# Patient Record
Sex: MALE | Race: WHITE | NOT HISPANIC OR LATINO | Employment: FULL TIME | URBAN - METROPOLITAN AREA
[De-identification: names, ages, dates, MRNs, and addresses within clinical notes are randomized per-mention and may not be internally consistent; named-entity substitution may affect disease eponyms.]

---

## 2017-02-10 RX ORDER — FLUOXETINE HYDROCHLORIDE 20 MG/1
CAPSULE ORAL
Qty: 30 CAPSULE | Refills: 1 | OUTPATIENT
Start: 2017-02-10

## 2017-02-10 RX ORDER — LISINOPRIL 10 MG/1
TABLET ORAL
Qty: 60 TABLET | Refills: 1 | OUTPATIENT
Start: 2017-02-10

## 2017-02-10 RX ORDER — PRAVASTATIN SODIUM 40 MG
TABLET ORAL
Qty: 30 TABLET | Refills: 2 | OUTPATIENT
Start: 2017-02-10

## 2017-02-13 RX ORDER — FLUOXETINE HYDROCHLORIDE 20 MG/1
CAPSULE ORAL
Qty: 30 CAPSULE | Refills: 1 | Status: SHIPPED | OUTPATIENT
Start: 2017-02-13 | End: 2017-04-17 | Stop reason: SDUPTHER

## 2017-02-13 RX ORDER — LISINOPRIL 10 MG/1
TABLET ORAL
Qty: 60 TABLET | Refills: 1 | Status: SHIPPED | OUTPATIENT
Start: 2017-02-13 | End: 2017-04-17 | Stop reason: SDUPTHER

## 2017-02-13 RX ORDER — PRAVASTATIN SODIUM 40 MG
TABLET ORAL
Qty: 30 TABLET | Refills: 2 | Status: SHIPPED | OUTPATIENT
Start: 2017-02-13 | End: 2017-04-17 | Stop reason: SDUPTHER

## 2017-02-21 PROBLEM — I10 HYPERTENSION: Status: ACTIVE | Noted: 2017-02-21

## 2017-04-04 RX ORDER — LISINOPRIL 10 MG/1
TABLET ORAL
Qty: 60 TABLET | Refills: 0 | OUTPATIENT
Start: 2017-04-04

## 2017-04-04 RX ORDER — FLUOXETINE HYDROCHLORIDE 20 MG/1
CAPSULE ORAL
Qty: 30 CAPSULE | Refills: 0 | OUTPATIENT
Start: 2017-04-04

## 2017-04-17 RX ORDER — FLUOXETINE HYDROCHLORIDE 20 MG/1
20 CAPSULE ORAL DAILY
Qty: 30 CAPSULE | Refills: 0 | Status: SHIPPED | OUTPATIENT
Start: 2017-04-17 | End: 2017-05-15 | Stop reason: SDUPTHER

## 2017-04-17 RX ORDER — PRAVASTATIN SODIUM 40 MG
40 TABLET ORAL DAILY
Qty: 30 TABLET | Refills: 0 | Status: SHIPPED | OUTPATIENT
Start: 2017-04-17 | End: 2017-05-18 | Stop reason: SDUPTHER

## 2017-04-17 RX ORDER — LISINOPRIL 10 MG/1
TABLET ORAL
Qty: 60 TABLET | Refills: 0 | Status: SHIPPED | OUTPATIENT
Start: 2017-04-17 | End: 2017-05-15 | Stop reason: SDUPTHER

## 2017-05-15 ENCOUNTER — OFFICE VISIT (OUTPATIENT)
Dept: INTERNAL MEDICINE | Facility: CLINIC | Age: 47
End: 2017-05-15

## 2017-05-15 VITALS
BODY MASS INDEX: 26.47 KG/M2 | HEART RATE: 89 BPM | WEIGHT: 195.2 LBS | DIASTOLIC BLOOD PRESSURE: 90 MMHG | OXYGEN SATURATION: 98 % | SYSTOLIC BLOOD PRESSURE: 142 MMHG

## 2017-05-15 DIAGNOSIS — I10 ESSENTIAL HYPERTENSION: ICD-10-CM

## 2017-05-15 DIAGNOSIS — G89.29 CHRONIC BILATERAL LOW BACK PAIN WITH BILATERAL SCIATICA: Primary | ICD-10-CM

## 2017-05-15 DIAGNOSIS — M54.42 CHRONIC BILATERAL LOW BACK PAIN WITH BILATERAL SCIATICA: Primary | ICD-10-CM

## 2017-05-15 DIAGNOSIS — M54.41 CHRONIC BILATERAL LOW BACK PAIN WITH BILATERAL SCIATICA: Primary | ICD-10-CM

## 2017-05-15 DIAGNOSIS — F43.89 REACTION TO CHRONIC STRESS: ICD-10-CM

## 2017-05-15 DIAGNOSIS — Z72.0 TOBACCO USE: ICD-10-CM

## 2017-05-15 PROCEDURE — 99214 OFFICE O/P EST MOD 30 MIN: CPT | Performed by: INTERNAL MEDICINE

## 2017-05-15 RX ORDER — FLUOXETINE HYDROCHLORIDE 20 MG/1
40 CAPSULE ORAL DAILY
Qty: 60 CAPSULE | Refills: 5 | Status: SHIPPED | OUTPATIENT
Start: 2017-05-15 | End: 2017-11-15

## 2017-05-15 RX ORDER — VARENICLINE TARTRATE 1 MG/1
1 TABLET, FILM COATED ORAL 2 TIMES DAILY
Qty: 60 TABLET | Refills: 1 | Status: SHIPPED | OUTPATIENT
Start: 2017-05-15 | End: 2018-06-04

## 2017-05-15 RX ORDER — LISINOPRIL 10 MG/1
TABLET ORAL
Qty: 60 TABLET | Refills: 0 | Status: SHIPPED | OUTPATIENT
Start: 2017-05-15 | End: 2017-06-14 | Stop reason: SDUPTHER

## 2017-05-17 ENCOUNTER — TELEPHONE (OUTPATIENT)
Dept: INTERNAL MEDICINE | Facility: CLINIC | Age: 47
End: 2017-05-17

## 2017-05-18 RX ORDER — PRAVASTATIN SODIUM 40 MG
40 TABLET ORAL DAILY
Qty: 30 TABLET | Refills: 5 | Status: SHIPPED | OUTPATIENT
Start: 2017-05-18 | End: 2018-06-04

## 2017-05-26 ENCOUNTER — TELEPHONE (OUTPATIENT)
Dept: INTERNAL MEDICINE | Facility: CLINIC | Age: 47
End: 2017-05-26

## 2017-05-30 ENCOUNTER — HOSPITAL ENCOUNTER (OUTPATIENT)
Dept: MRI IMAGING | Facility: HOSPITAL | Age: 47
Discharge: HOME | End: 2017-05-30
Attending: INTERNAL MEDICINE | Admitting: INTERNAL MEDICINE

## 2017-05-30 PROCEDURE — 72148 MRI LUMBAR SPINE W/O DYE: CPT

## 2017-06-14 DIAGNOSIS — I10 ESSENTIAL HYPERTENSION: ICD-10-CM

## 2017-06-15 RX ORDER — LISINOPRIL 10 MG/1
TABLET ORAL
Qty: 60 TABLET | Refills: 2 | Status: SHIPPED | OUTPATIENT
Start: 2017-06-15 | End: 2017-09-13 | Stop reason: SDUPTHER

## 2017-07-06 ENCOUNTER — OFFICE VISIT (OUTPATIENT)
Dept: INTERNAL MEDICINE | Facility: CLINIC | Age: 47
End: 2017-07-06

## 2017-07-06 VITALS
OXYGEN SATURATION: 98 % | DIASTOLIC BLOOD PRESSURE: 86 MMHG | BODY MASS INDEX: 25.77 KG/M2 | HEART RATE: 108 BPM | WEIGHT: 190 LBS | SYSTOLIC BLOOD PRESSURE: 140 MMHG

## 2017-07-06 DIAGNOSIS — M54.41 CHRONIC BILATERAL LOW BACK PAIN WITH BILATERAL SCIATICA: Primary | ICD-10-CM

## 2017-07-06 DIAGNOSIS — G89.29 CHRONIC BILATERAL LOW BACK PAIN WITH BILATERAL SCIATICA: Primary | ICD-10-CM

## 2017-07-06 DIAGNOSIS — M54.42 CHRONIC BILATERAL LOW BACK PAIN WITH BILATERAL SCIATICA: Primary | ICD-10-CM

## 2017-07-06 PROCEDURE — 99213 OFFICE O/P EST LOW 20 MIN: CPT | Performed by: INTERNAL MEDICINE

## 2017-07-06 NOTE — PROGRESS NOTES
discuss results (MRI results performed on 5/30/17)    Subjective   Galo Jama is a 46 y.o. male is here today for follow-up.    History of Present Illness   Galo is here for a follow up on his MRI. He has been having increased problems and is on a very busy schedule.  He would like to proceed with Neurosurgery.He notes that he has been on meds since 2003- the HC/ apap, neurontin and elavil for > 7-8 years and has had injections by Dr. Hernandez and Tim in the past.  Asking if I can continue his meds, or in a different direction than pain management.  Echols snot started chantix yet.      Current Outpatient Prescriptions:   •  amitriptyline (ELAVIL) 50 MG tablet, Take 1 tablet by mouth every night., Disp: , Rfl:   •  FLUoxetine (PROzac) 20 MG capsule, Take 2 capsules by mouth Daily., Disp: 60 capsule, Rfl: 5  •  gabapentin (NEURONTIN) 600 MG tablet, Take 1 tablet by mouth 3 (three) times a day., Disp: , Rfl:   •  HYDROcodone-acetaminophen (NORCO)  MG per tablet, Take 1 tablet by mouth every 6 (six) hours as needed., Disp: , Rfl:   •  lisinopril (PRINIVIL,ZESTRIL) 10 MG tablet, TAKE ONE TABLET BY MOUTH TWICE A DAY, Disp: 60 tablet, Rfl: 2  •  pravastatin (PRAVACHOL) 40 MG tablet, Take 1 tablet by mouth Daily., Disp: 30 tablet, Rfl: 5  •  tiZANidine (ZANAFLEX) 4 MG tablet, Take 1 tablet by mouth 3 (three) times a day., Disp: , Rfl:   •  varenicline (CHANTIX CONTINUING MONTH PAK) 1 MG tablet, Take 1 tablet by mouth 2 (Two) Times a Day., Disp: 60 tablet, Rfl: 1  •  varenicline (CHANTIX STARTING MONTH PAK) 0.5 MG X 11 & 1 MG X 42 tablet, Take 0.5 mg one daily on days 1-2 and 0.5 mg twice daily on days 4-7. Then 1 mg twice daily for a total of 12 weeks., Disp: 53 tablet, Rfl: 0      The following portions of the patient's history were reviewed and updated as appropriate: allergies, current medications, past family history, past medical history, past social history, past surgical history and problem  list.    Review of Systems   Constitutional: Negative.  Negative for chills and fever.   HENT: Negative for ear discharge, ear pain, sinus pressure and sore throat.    Respiratory: Negative for cough, chest tightness and shortness of breath.    Cardiovascular: Negative for chest pain, palpitations and leg swelling.   Gastrointestinal: Negative for diarrhea, nausea and vomiting.   Musculoskeletal: Positive for arthralgias, back pain and myalgias.   Neurological: Negative for dizziness, syncope and headaches.   Psychiatric/Behavioral: Negative for confusion and sleep disturbance.       Objective   /86  Pulse 108  Wt 190 lb (86.2 kg)  SpO2 98%  BMI 25.77 kg/m2  Physical Exam   Musculoskeletal: He exhibits tenderness (rt back tenderness- L2-4).         Results for orders placed or performed in visit on 04/21/16    MAMMOGRAPHY   Result Value Ref Range     Mammogram       single polyp, mild diverticulosis, otherwise normal             Assessment/Plan   Diagnoses and all orders for this visit:    Chronic bilateral low back pain with bilateral sciatica  -     Ambulatory Referral to Neurosurgery             Will check with Dr. Escobar first. His dtrs work at Dr. Carrasco's office, but wants to hold off on that for now, and look at surgical options.    Return in about 6 months (around 1/6/2018) for Next scheduled follow up.

## 2017-09-13 DIAGNOSIS — I10 ESSENTIAL HYPERTENSION: ICD-10-CM

## 2017-09-13 RX ORDER — LISINOPRIL 10 MG/1
TABLET ORAL
Qty: 60 TABLET | Refills: 1 | Status: SHIPPED | OUTPATIENT
Start: 2017-09-13 | End: 2017-11-10 | Stop reason: SDUPTHER

## 2017-11-10 DIAGNOSIS — I10 ESSENTIAL HYPERTENSION: ICD-10-CM

## 2017-11-11 RX ORDER — LISINOPRIL 10 MG/1
TABLET ORAL
Qty: 60 TABLET | Refills: 0 | Status: SHIPPED | OUTPATIENT
Start: 2017-11-11 | End: 2017-12-11 | Stop reason: SDUPTHER

## 2017-11-15 ENCOUNTER — OFFICE VISIT (OUTPATIENT)
Dept: INTERNAL MEDICINE | Facility: CLINIC | Age: 47
End: 2017-11-15

## 2017-11-15 VITALS
OXYGEN SATURATION: 100 % | HEART RATE: 74 BPM | DIASTOLIC BLOOD PRESSURE: 78 MMHG | BODY MASS INDEX: 24.79 KG/M2 | WEIGHT: 183 LBS | RESPIRATION RATE: 16 BRPM | SYSTOLIC BLOOD PRESSURE: 148 MMHG | HEIGHT: 72 IN

## 2017-11-15 DIAGNOSIS — G89.29 CHRONIC BILATERAL LOW BACK PAIN WITH BILATERAL SCIATICA: Primary | ICD-10-CM

## 2017-11-15 DIAGNOSIS — M79.7 FIBROMYALGIA: ICD-10-CM

## 2017-11-15 DIAGNOSIS — M54.42 CHRONIC BILATERAL LOW BACK PAIN WITH BILATERAL SCIATICA: Primary | ICD-10-CM

## 2017-11-15 DIAGNOSIS — M54.41 CHRONIC BILATERAL LOW BACK PAIN WITH BILATERAL SCIATICA: Primary | ICD-10-CM

## 2017-11-15 PROCEDURE — 99213 OFFICE O/P EST LOW 20 MIN: CPT | Performed by: INTERNAL MEDICINE

## 2017-11-15 RX ORDER — DULOXETIN HYDROCHLORIDE 30 MG/1
CAPSULE, DELAYED RELEASE ORAL
Qty: 60 CAPSULE | Refills: 3 | Status: SHIPPED | OUTPATIENT
Start: 2017-11-15 | End: 2018-06-04

## 2017-11-15 NOTE — PROGRESS NOTES
Back Pain (lower back pain follow up)    Subjective   Galo Jama is a 47 y.o. male is here today for follow-up.    History of Present Illness   Galo is here for follow up on his back pain, he never made it to his neurosurgery appointment, or to Dr. Carrasco.  He notes that he feels he as fibromyalgia. He has researched that he meets the criteria.  He is asking if he can be treated with gabapentin or Lyrica? does not give any history as below-   Per his sister, he was with Dr. Hernandez, but had a UDS with THC positive hence terminated, and needs his Gabapentin today.      Current Outpatient Prescriptions:   •  amitriptyline (ELAVIL) 50 MG tablet, Take 1 tablet by mouth every night., Disp: , Rfl:   •  lisinopril (PRINIVIL,ZESTRIL) 10 MG tablet, TAKE ONE TABLET BY MOUTH TWICE A DAY, Disp: 60 tablet, Rfl: 0  •  pravastatin (PRAVACHOL) 40 MG tablet, Take 1 tablet by mouth Daily., Disp: 30 tablet, Rfl: 5  •  varenicline (CHANTIX CONTINUING MONTH PAK) 1 MG tablet, Take 1 tablet by mouth 2 (Two) Times a Day., Disp: 60 tablet, Rfl: 1  •  varenicline (CHANTIX STARTING MONTH PAK) 0.5 MG X 11 & 1 MG X 42 tablet, Take 0.5 mg one daily on days 1-2 and 0.5 mg twice daily on days 4-7. Then 1 mg twice daily for a total of 12 weeks., Disp: 53 tablet, Rfl: 0  •  DULoxetine (CYMBALTA) 30 MG capsule, 1 PO daily x 1 week then 2 PO daily., Disp: 60 capsule, Rfl: 3      The following portions of the patient's history were reviewed and updated as appropriate: allergies, current medications, past family history, past medical history, past social history, past surgical history and problem list.    Review of Systems   Constitutional: Positive for fatigue. Negative for chills and fever.   HENT: Negative for ear discharge, ear pain, sinus pressure and sore throat.    Respiratory: Negative for cough, chest tightness and shortness of breath.    Cardiovascular: Negative for chest pain, palpitations and leg swelling.   Gastrointestinal:  "Negative for diarrhea, nausea and vomiting.   Musculoskeletal: Positive for arthralgias, back pain and myalgias.   Neurological: Negative for dizziness, syncope and headaches.   Psychiatric/Behavioral: Positive for sleep disturbance. Negative for confusion.       Objective   /78  Pulse 74  Resp 16  Ht 72\" (182.9 cm)  Wt 183 lb (83 kg)  SpO2 100%  BMI 24.82 kg/m2  Physical Exam   Constitutional: He is oriented to person, place, and time. He appears well-developed and well-nourished.   HENT:   Head: Normocephalic and atraumatic.   Mouth/Throat: No oropharyngeal exudate.   Eyes: Conjunctivae are normal. Pupils are equal, round, and reactive to light.   Neck: Neck supple. No thyromegaly present.   Cardiovascular: Normal rate and regular rhythm.    Pulmonary/Chest: Effort normal and breath sounds normal.   Abdominal: Soft. Bowel sounds are normal. He exhibits no distension. There is no tenderness.   Musculoskeletal: He exhibits tenderness (rt back muscle spasm in t-8- t-10). He exhibits no edema.   Neurological: He is alert and oriented to person, place, and time. No cranial nerve deficit.   Skin: Skin is warm and dry.   Psychiatric: He has a normal mood and affect. Judgment normal.   Nursing note and vitals reviewed.        Results for orders placed or performed in visit on 04/21/16    MAMMOGRAPHY   Result Value Ref Range     Mammogram       single polyp, mild diverticulosis, otherwise normal             Assessment/Plan   Diagnoses and all orders for this visit:    Chronic bilateral low back pain with bilateral sciatica  -     DULoxetine (CYMBALTA) 30 MG capsule; 1 PO daily x 1 week then 2 PO daily.  -     Ambulatory Referral to Pain Management    Fibromyalgia  -     DULoxetine (CYMBALTA) 30 MG capsule; 1 PO daily x 1 week then 2 PO daily.  -     Ambulatory Referral to Pain Management      Adv unable to start any controls,start cymbalta, refer to [facility].       D/c prozac and start cymbalta.    Return for " Next scheduled follow up.

## 2017-12-11 DIAGNOSIS — I10 ESSENTIAL HYPERTENSION: ICD-10-CM

## 2017-12-11 RX ORDER — LISINOPRIL 10 MG/1
TABLET ORAL
Qty: 60 TABLET | Refills: 0 | Status: SHIPPED | OUTPATIENT
Start: 2017-12-11 | End: 2018-01-09 | Stop reason: SDUPTHER

## 2018-01-09 DIAGNOSIS — I10 ESSENTIAL HYPERTENSION: ICD-10-CM

## 2018-01-09 RX ORDER — LISINOPRIL 10 MG/1
TABLET ORAL
Qty: 60 TABLET | Refills: 0 | Status: SHIPPED | OUTPATIENT
Start: 2018-01-09 | End: 2018-02-09 | Stop reason: SDUPTHER

## 2018-01-22 RX ORDER — PRAVASTATIN SODIUM 40 MG
40 TABLET ORAL DAILY
Qty: 30 TABLET | Refills: 0 | Status: SHIPPED | OUTPATIENT
Start: 2018-01-22 | End: 2018-03-06 | Stop reason: SDUPTHER

## 2018-02-09 DIAGNOSIS — I10 ESSENTIAL HYPERTENSION: ICD-10-CM

## 2018-02-09 RX ORDER — LISINOPRIL 10 MG/1
TABLET ORAL
Qty: 60 TABLET | Refills: 0 | Status: SHIPPED | OUTPATIENT
Start: 2018-02-09 | End: 2018-09-06

## 2018-03-06 DIAGNOSIS — I10 ESSENTIAL HYPERTENSION: ICD-10-CM

## 2018-03-06 RX ORDER — PRAVASTATIN SODIUM 40 MG
TABLET ORAL
Qty: 30 TABLET | Refills: 0 | Status: SHIPPED | OUTPATIENT
Start: 2018-03-06 | End: 2018-09-06

## 2018-03-07 RX ORDER — LISINOPRIL 10 MG/1
TABLET ORAL
Qty: 60 TABLET | Refills: 0 | OUTPATIENT
Start: 2018-03-07

## 2018-06-06 ENCOUNTER — TRANSCRIBE ORDERS (OUTPATIENT)
Dept: PHYSICAL THERAPY | Facility: CLINIC | Age: 48
End: 2018-06-06

## 2018-06-06 ENCOUNTER — TELEPHONE (OUTPATIENT)
Dept: URGENT CARE | Facility: CLINIC | Age: 48
End: 2018-06-06

## 2018-06-06 ENCOUNTER — OFFICE VISIT (OUTPATIENT)
Dept: PHYSICAL THERAPY | Facility: CLINIC | Age: 48
End: 2018-06-06

## 2018-06-06 DIAGNOSIS — S62.524A NONDISPLACED FRACTURE OF DISTAL PHALANX OF RIGHT THUMB, INITIAL ENCOUNTER FOR CLOSED FRACTURE: Primary | ICD-10-CM

## 2018-06-06 PROCEDURE — L3913 HFO W/O JOINTS CF: HCPCS | Performed by: PHYSICAL THERAPIST

## 2018-06-08 NOTE — PROGRESS NOTES
Galo Jama 1970   Diagnosis/ Surgery: Right thumb fracture               Date Of Injury: 6/4/18    Date Of Surgery:NA    Hand Dominance: right   History of Present Condition: Pt fell off a ladder at home, he tried to catch himself with the right hand and fractured the thumb  Medical/Vocational History/ Medications: Pt works as a supervisor for a , he is able to complete all job duties. Pt has history of previous injury to the left hand that limits his function with that hand.     Pain: 6-7/10    Edema: mild-moderate   Sensibility: WNL   Wound Status: NA  ROM/ Strength/Test: NT    Splinting:  · Patient was measure and fit with a custom fabricated hand based thumb spica    · Patient was instructed in wearing schedule, precautions and care of the splint during this visit.   · Patient was instructed in proper donning/doffing of splint.   Assessment:  · Patient was fitted and appropriate splint was fabricated this date.  · Patient reported that splint was comfortable and had no complications with the fit of the splint.  · Patient was instructed and patient verbalized understanding of precautions, wear and care of the splint.   · Patient demonstrated independent donning/doffing of splint during treatment today.  Goals:  · Patient was fitted properly with appropriate splint for diagnosis  · Patient was educated on precautions, wear schedule and care of splint  · Patient demonstrated independence with donning/doffing of the splint.  · Splint was provided to Protect Healing Structures, Restrict Mobility, Improve joint alignment.  Plan:  · No additional treatment is required for this patient at this time. The patient is therefore discharged from therapy.  · Patient advised to contact therapist with any additional questions or concerns regarding the fit and function of the splint.  · Patient will be seen for splint issues as needed   · Wear Instructions: Off for hygiene       PT SIGNATURE: Shayan Orlando,  PT   DATE TREATMENT INITIATED: 6/8/2018    Physician Signature____________________________________ Date____________

## 2018-09-06 ENCOUNTER — APPOINTMENT (OUTPATIENT)
Dept: CARDIOLOGY | Facility: HOSPITAL | Age: 48
End: 2018-09-06

## 2018-09-06 ENCOUNTER — APPOINTMENT (OUTPATIENT)
Dept: GENERAL RADIOLOGY | Facility: HOSPITAL | Age: 48
End: 2018-09-06

## 2018-09-06 ENCOUNTER — HOSPITAL ENCOUNTER (EMERGENCY)
Facility: HOSPITAL | Age: 48
Discharge: HOME | End: 2018-09-06
Attending: EMERGENCY MEDICINE | Admitting: EMERGENCY MEDICINE

## 2018-09-06 VITALS
SYSTOLIC BLOOD PRESSURE: 128 MMHG | HEART RATE: 72 BPM | WEIGHT: 193 LBS | OXYGEN SATURATION: 99 % | RESPIRATION RATE: 16 BRPM | TEMPERATURE: 97.6 F | DIASTOLIC BLOOD PRESSURE: 69 MMHG | BODY MASS INDEX: 26.14 KG/M2 | HEIGHT: 72 IN

## 2018-09-06 DIAGNOSIS — I10 ESSENTIAL HYPERTENSION: Primary | ICD-10-CM

## 2018-09-06 DIAGNOSIS — R07.89 ATYPICAL CHEST PAIN: ICD-10-CM

## 2018-09-06 LAB
ALBUMIN SERPL-MCNC: 4.45 G/DL (ref 3.2–4.8)
ALBUMIN/GLOB SERPL: 2 G/DL (ref 1.5–2.5)
ALP SERPL-CCNC: 66 U/L (ref 25–100)
ALT SERPL W P-5'-P-CCNC: 15 U/L (ref 7–40)
ANION GAP SERPL CALCULATED.3IONS-SCNC: 4 MMOL/L (ref 3–11)
AST SERPL-CCNC: 21 U/L (ref 0–33)
BASOPHILS # BLD AUTO: 0.02 10*3/MM3 (ref 0–0.2)
BASOPHILS NFR BLD AUTO: 0.2 % (ref 0–1)
BH CV STRESS BP STAGE 1: NORMAL
BH CV STRESS BP STAGE 2: NORMAL
BH CV STRESS BP STAGE 3: NORMAL
BH CV STRESS DURATION MIN STAGE 1: 3
BH CV STRESS DURATION MIN STAGE 2: 3
BH CV STRESS DURATION MIN STAGE 3: 3
BH CV STRESS DURATION MIN STAGE 4: 1
BH CV STRESS DURATION SEC STAGE 1: 0
BH CV STRESS DURATION SEC STAGE 2: 0
BH CV STRESS DURATION SEC STAGE 3: 0
BH CV STRESS DURATION SEC STAGE 4: 30
BH CV STRESS GRADE STAGE 1: 10
BH CV STRESS GRADE STAGE 2: 12
BH CV STRESS GRADE STAGE 3: 14
BH CV STRESS GRADE STAGE 4: 16
BH CV STRESS HR STAGE 1: 108
BH CV STRESS HR STAGE 2: 116
BH CV STRESS HR STAGE 3: 133
BH CV STRESS HR STAGE 4: 136
BH CV STRESS METS STAGE 1: 5
BH CV STRESS METS STAGE 2: 7.5
BH CV STRESS METS STAGE 3: 10
BH CV STRESS METS STAGE 4: 13.5
BH CV STRESS PROTOCOL 1: NORMAL
BH CV STRESS RECOVERY BP: NORMAL MMHG
BH CV STRESS RECOVERY HR: 86 BPM
BH CV STRESS SPEED STAGE 1: 1.7
BH CV STRESS SPEED STAGE 2: 2.5
BH CV STRESS SPEED STAGE 3: 3.4
BH CV STRESS SPEED STAGE 4: 4.2
BH CV STRESS STAGE 1: 1
BH CV STRESS STAGE 2: 2
BH CV STRESS STAGE 3: 3
BH CV STRESS STAGE 4: 4
BILIRUB SERPL-MCNC: 0.3 MG/DL (ref 0.3–1.2)
BNP SERPL-MCNC: 8 PG/ML (ref 0–100)
BUN BLD-MCNC: 13 MG/DL (ref 9–23)
BUN/CREAT SERPL: 15.5 (ref 7–25)
CALCIUM SPEC-SCNC: 8.8 MG/DL (ref 8.7–10.4)
CHLORIDE SERPL-SCNC: 107 MMOL/L (ref 99–109)
CO2 SERPL-SCNC: 29 MMOL/L (ref 20–31)
CREAT BLD-MCNC: 0.84 MG/DL (ref 0.6–1.3)
DEPRECATED RDW RBC AUTO: 43.6 FL (ref 37–54)
EOSINOPHIL # BLD AUTO: 0.19 10*3/MM3 (ref 0–0.3)
EOSINOPHIL NFR BLD AUTO: 2.1 % (ref 0–3)
ERYTHROCYTE [DISTWIDTH] IN BLOOD BY AUTOMATED COUNT: 13.6 % (ref 11.3–14.5)
GFR SERPL CREATININE-BSD FRML MDRD: 98 ML/MIN/1.73
GLOBULIN UR ELPH-MCNC: 2.3 GM/DL
GLUCOSE BLD-MCNC: 105 MG/DL (ref 70–100)
HCT VFR BLD AUTO: 45 % (ref 38.9–50.9)
HGB BLD-MCNC: 14.7 G/DL (ref 13.1–17.5)
HOLD SPECIMEN: NORMAL
HOLD SPECIMEN: NORMAL
IMM GRANULOCYTES # BLD: 0.02 10*3/MM3 (ref 0–0.03)
IMM GRANULOCYTES NFR BLD: 0.2 % (ref 0–0.6)
LIPASE SERPL-CCNC: 20 U/L (ref 6–51)
LV EF NUC BP: 58 %
LYMPHOCYTES # BLD AUTO: 2.46 10*3/MM3 (ref 0.6–4.8)
LYMPHOCYTES NFR BLD AUTO: 27.2 % (ref 24–44)
MAXIMAL PREDICTED HEART RATE: 173 BPM
MCH RBC QN AUTO: 28.5 PG (ref 27–31)
MCHC RBC AUTO-ENTMCNC: 32.7 G/DL (ref 32–36)
MCV RBC AUTO: 87.2 FL (ref 80–99)
MONOCYTES # BLD AUTO: 0.73 10*3/MM3 (ref 0–1)
MONOCYTES NFR BLD AUTO: 8.1 % (ref 0–12)
NEUTROPHILS # BLD AUTO: 5.66 10*3/MM3 (ref 1.5–8.3)
NEUTROPHILS NFR BLD AUTO: 62.4 % (ref 41–71)
PERCENT MAX PREDICTED HR: 88.44 %
PLATELET # BLD AUTO: 174 10*3/MM3 (ref 150–450)
PMV BLD AUTO: 10 FL (ref 6–12)
POTASSIUM BLD-SCNC: 4.5 MMOL/L (ref 3.5–5.5)
PROT SERPL-MCNC: 6.7 G/DL (ref 5.7–8.2)
RBC # BLD AUTO: 5.16 10*6/MM3 (ref 4.2–5.76)
SODIUM BLD-SCNC: 140 MMOL/L (ref 132–146)
STRESS BASELINE BP: NORMAL MMHG
STRESS BASELINE HR: 61 BPM
STRESS PERCENT HR: 104 %
STRESS POST ESTIMATED WORKLOAD: 10.8 METS
STRESS POST EXERCISE DUR MIN: 10 MIN
STRESS POST EXERCISE DUR SEC: 30 SEC
STRESS POST PEAK BP: NORMAL MMHG
STRESS POST PEAK HR: 153 BPM
STRESS TARGET HR: 147 BPM
TROPONIN I SERPL-MCNC: 0 NG/ML (ref 0–0.07)
TROPONIN I SERPL-MCNC: 0 NG/ML (ref 0–0.07)
WBC NRBC COR # BLD: 9.06 10*3/MM3 (ref 3.5–10.8)
WHOLE BLOOD HOLD SPECIMEN: NORMAL
WHOLE BLOOD HOLD SPECIMEN: NORMAL

## 2018-09-06 PROCEDURE — 84484 ASSAY OF TROPONIN QUANT: CPT

## 2018-09-06 PROCEDURE — 0 TECHNETIUM SESTAMIBI: Performed by: EMERGENCY MEDICINE

## 2018-09-06 PROCEDURE — 83880 ASSAY OF NATRIURETIC PEPTIDE: CPT | Performed by: EMERGENCY MEDICINE

## 2018-09-06 PROCEDURE — 93017 CV STRESS TEST TRACING ONLY: CPT

## 2018-09-06 PROCEDURE — 80053 COMPREHEN METABOLIC PANEL: CPT | Performed by: EMERGENCY MEDICINE

## 2018-09-06 PROCEDURE — 93018 CV STRESS TEST I&R ONLY: CPT | Performed by: INTERNAL MEDICINE

## 2018-09-06 PROCEDURE — 78452 HT MUSCLE IMAGE SPECT MULT: CPT

## 2018-09-06 PROCEDURE — 99284 EMERGENCY DEPT VISIT MOD MDM: CPT | Performed by: INTERNAL MEDICINE

## 2018-09-06 PROCEDURE — 85025 COMPLETE CBC W/AUTO DIFF WBC: CPT | Performed by: EMERGENCY MEDICINE

## 2018-09-06 PROCEDURE — 78452 HT MUSCLE IMAGE SPECT MULT: CPT | Performed by: INTERNAL MEDICINE

## 2018-09-06 PROCEDURE — 93005 ELECTROCARDIOGRAM TRACING: CPT | Performed by: EMERGENCY MEDICINE

## 2018-09-06 PROCEDURE — A9500 TC99M SESTAMIBI: HCPCS | Performed by: EMERGENCY MEDICINE

## 2018-09-06 PROCEDURE — 71045 X-RAY EXAM CHEST 1 VIEW: CPT

## 2018-09-06 PROCEDURE — 83690 ASSAY OF LIPASE: CPT | Performed by: EMERGENCY MEDICINE

## 2018-09-06 PROCEDURE — 99285 EMERGENCY DEPT VISIT HI MDM: CPT

## 2018-09-06 RX ORDER — ATORVASTATIN CALCIUM 40 MG/1
80 TABLET, FILM COATED ORAL DAILY
Status: DISCONTINUED | OUTPATIENT
Start: 2018-09-06 | End: 2018-09-06 | Stop reason: HOSPADM

## 2018-09-06 RX ORDER — ACETAMINOPHEN 500 MG
1000 TABLET ORAL ONCE
Status: COMPLETED | OUTPATIENT
Start: 2018-09-06 | End: 2018-09-06

## 2018-09-06 RX ORDER — ASPIRIN 81 MG/1
324 TABLET, CHEWABLE ORAL ONCE
Status: COMPLETED | OUTPATIENT
Start: 2018-09-06 | End: 2018-09-06

## 2018-09-06 RX ORDER — ASPIRIN 81 MG/1
81 TABLET ORAL DAILY
Qty: 30 TABLET | Refills: 11 | OUTPATIENT
Start: 2018-09-06 | End: 2020-06-14

## 2018-09-06 RX ORDER — ATORVASTATIN CALCIUM 80 MG/1
80 TABLET, FILM COATED ORAL DAILY
Qty: 30 TABLET | Refills: 11 | Status: SHIPPED | OUTPATIENT
Start: 2018-09-06 | End: 2019-09-05 | Stop reason: SDUPTHER

## 2018-09-06 RX ORDER — MAGNESIUM HYDROXIDE/ALUMINUM HYDROXICE/SIMETHICONE 120; 1200; 1200 MG/30ML; MG/30ML; MG/30ML
20 SUSPENSION ORAL ONCE
Status: COMPLETED | OUTPATIENT
Start: 2018-09-06 | End: 2018-09-06

## 2018-09-06 RX ORDER — SODIUM CHLORIDE 0.9 % (FLUSH) 0.9 %
10 SYRINGE (ML) INJECTION AS NEEDED
Status: DISCONTINUED | OUTPATIENT
Start: 2018-09-06 | End: 2018-09-06 | Stop reason: HOSPADM

## 2018-09-06 RX ORDER — LISINOPRIL 10 MG/1
20 TABLET ORAL ONCE
Status: COMPLETED | OUTPATIENT
Start: 2018-09-06 | End: 2018-09-06

## 2018-09-06 RX ORDER — LISINOPRIL 40 MG/1
40 TABLET ORAL DAILY
Qty: 30 TABLET | Refills: 11 | Status: SHIPPED | OUTPATIENT
Start: 2018-09-06 | End: 2019-09-05 | Stop reason: SDUPTHER

## 2018-09-06 RX ADMIN — ASPIRIN 81 MG 324 MG: 81 TABLET ORAL at 10:09

## 2018-09-06 RX ADMIN — TECHNETIUM TC 99M SESTAMIBI 1 DOSE: 1 INJECTION INTRAVENOUS at 15:00

## 2018-09-06 RX ADMIN — TECHNETIUM TC 99M SESTAMIBI 1 DOSE: 1 INJECTION INTRAVENOUS at 13:20

## 2018-09-06 RX ADMIN — ATORVASTATIN CALCIUM 80 MG: 40 TABLET, FILM COATED ORAL at 12:48

## 2018-09-06 RX ADMIN — ALUMINUM HYDROXIDE, MAGNESIUM HYDROXIDE, AND SIMETHICONE 20 ML: 200; 200; 20 SUSPENSION ORAL at 10:11

## 2018-09-06 RX ADMIN — ACETAMINOPHEN 1000 MG: 500 TABLET, FILM COATED ORAL at 14:07

## 2018-09-06 RX ADMIN — NITROGLYCERIN 1 INCH: 20 OINTMENT TOPICAL at 10:09

## 2018-09-06 RX ADMIN — LISINOPRIL 20 MG: 10 TABLET ORAL at 10:51

## 2018-09-06 NOTE — ED PROVIDER NOTES
Subjective   Mr. Galo Jama is a 48 y.o. male who presents to the ED with c/o CP. He reports that his wife woke him up around 0630 this morning and when he woke up he had burning CP that is worse with coughing and radiated into his left arm. He also complains of mild SoA, nausea, a cough, and left leg numbness but denies sweating, a sore throat, itchy eyes, runny nose, or an acidic taste in his mouth. He has taken a baby aspirin with no relief. He notes that he also had some CP a few weeks ago. He has a hx of HTN, HLD, GERD, angioplasty, and an endoscopy that showed stomach ulcers. He has a family hx of MI. He has not taken his Lisinopril in around 5 months. He is a smoker. No other acute complaints at this time.        History provided by:  Patient  Chest Pain   Pain quality: burning    Pain radiates to:  L arm  Pain severity:  Moderate  Onset quality:  Sudden  Timing:  Constant  Progression:  Resolved  Chronicity:  New  Worsened by:  Coughing  Ineffective treatments:  Certain positions  Associated symptoms: cough, nausea, numbness (Left leg) and shortness of breath    Associated symptoms: no diaphoresis    Risk factors: high cholesterol, hypertension and smoking    Risk factors: no diabetes mellitus        Review of Systems   Constitutional: Negative for diaphoresis.   HENT: Negative for rhinorrhea and sore throat.    Eyes: Negative for itching.   Respiratory: Positive for cough and shortness of breath.    Cardiovascular: Positive for chest pain.   Gastrointestinal: Positive for nausea.   Neurological: Positive for numbness (Left leg).   All other systems reviewed and are negative.      Past Medical History:   Diagnosis Date   • Depression    • Hyperlipidemia    • Hypertension    • Injury of back        No Known Allergies    Past Surgical History:   Procedure Laterality Date   • HAND TENDON REPAIR     • STEREOTACTIC PROCEDURE INTRACRANIAL / EXTRACRANIAL / SPINAL         Family History   Problem Relation Age of  Onset   • Cancer Other    • Diabetes Other    • Hypertension Other    • Kidney disease Other    • Heart attack Other    • Hyperlipidemia Other        Social History     Social History   • Marital status:      Social History Main Topics   • Smoking status: Current Every Day Smoker     Packs/day: 1.00   • Alcohol use Yes      Comment: 2 a year   • Drug use: No   • Sexual activity: Defer     Other Topics Concern   • Not on file         Objective   Physical Exam   Constitutional: He is oriented to person, place, and time. He appears well-developed and well-nourished. No distress.   HENT:   Head: Normocephalic and atraumatic.   Nose: Nose normal.   Mouth/Throat: Oropharynx is clear and moist.   Airway patent. Oropharynx benign.   Eyes: Conjunctivae are normal. No scleral icterus.   Neck: Normal range of motion and phonation normal. Neck supple.   Cardiovascular: Normal rate, regular rhythm and normal heart sounds.    Pulmonary/Chest: Effort normal and breath sounds normal. No respiratory distress. He exhibits tenderness.   TTP of the mid to lower sternum.   Abdominal: Soft. There is no tenderness.   Lymphadenopathy:     He has no cervical adenopathy.   Neurological: He is alert and oriented to person, place, and time.   Skin: Skin is warm and dry.   Psychiatric: He has a normal mood and affect. His behavior is normal.   Nursing note and vitals reviewed.      Procedures         ED Course  ED Course as of Sep 06 1823   Thu Sep 06, 2018   1035 Patient's pain is mostly resolved with occasional flutters although his blood pressure remains slightly elevated. I will consult with on-call cardiology. -LJI  [AT]   1041 I discussed the case with Dr. Burleson, cardiology, who tells me someone will be down to see Mr. Jama. -LJI  [AT]   1621 Dr. Burleson has read the stress test and informed the patient of the negative results.  In addition, he has sent prescriptions for BP and Chol to pt's pharmacy and wants f/u in 6-8  toney.  [LI]      ED Course User Index  [AT] Heather Hirsch  [LI] Adarsh Maldonado MD       Recent Results (from the past 24 hour(s))   Comprehensive Metabolic Panel    Collection Time: 09/06/18  8:01 AM   Result Value Ref Range    Glucose 105 (H) 70 - 100 mg/dL    BUN 13 9 - 23 mg/dL    Creatinine 0.84 0.60 - 1.30 mg/dL    Sodium 140 132 - 146 mmol/L    Potassium 4.5 3.5 - 5.5 mmol/L    Chloride 107 99 - 109 mmol/L    CO2 29.0 20.0 - 31.0 mmol/L    Calcium 8.8 8.7 - 10.4 mg/dL    Total Protein 6.7 5.7 - 8.2 g/dL    Albumin 4.45 3.20 - 4.80 g/dL    ALT (SGPT) 15 7 - 40 U/L    AST (SGOT) 21 0 - 33 U/L    Alkaline Phosphatase 66 25 - 100 U/L    Total Bilirubin 0.3 0.3 - 1.2 mg/dL    eGFR Non African Amer 98 >60 mL/min/1.73    Globulin 2.3 gm/dL    A/G Ratio 2.0 1.5 - 2.5 g/dL    BUN/Creatinine Ratio 15.5 7.0 - 25.0    Anion Gap 4.0 3.0 - 11.0 mmol/L   Lipase    Collection Time: 09/06/18  8:01 AM   Result Value Ref Range    Lipase 20 6 - 51 U/L   BNP    Collection Time: 09/06/18  8:01 AM   Result Value Ref Range    BNP 8.0 0.0 - 100.0 pg/mL   Light Blue Top    Collection Time: 09/06/18  8:01 AM   Result Value Ref Range    Extra Tube hold for add-on    Green Top (Gel)    Collection Time: 09/06/18  8:01 AM   Result Value Ref Range    Extra Tube Hold for add-ons.    Lavender Top    Collection Time: 09/06/18  8:01 AM   Result Value Ref Range    Extra Tube hold for add-on    Gold Top - SST    Collection Time: 09/06/18  8:01 AM   Result Value Ref Range    Extra Tube Hold for add-ons.    CBC Auto Differential    Collection Time: 09/06/18  8:01 AM   Result Value Ref Range    WBC 9.06 3.50 - 10.80 10*3/mm3    RBC 5.16 4.20 - 5.76 10*6/mm3    Hemoglobin 14.7 13.1 - 17.5 g/dL    Hematocrit 45.0 38.9 - 50.9 %    MCV 87.2 80.0 - 99.0 fL    MCH 28.5 27.0 - 31.0 pg    MCHC 32.7 32.0 - 36.0 g/dL    RDW 13.6 11.3 - 14.5 %    RDW-SD 43.6 37.0 - 54.0 fl    MPV 10.0 6.0 - 12.0 fL    Platelets 174 150 - 450 10*3/mm3    Neutrophil % 62.4  41.0 - 71.0 %    Lymphocyte % 27.2 24.0 - 44.0 %    Monocyte % 8.1 0.0 - 12.0 %    Eosinophil % 2.1 0.0 - 3.0 %    Basophil % 0.2 0.0 - 1.0 %    Immature Grans % 0.2 0.0 - 0.6 %    Neutrophils, Absolute 5.66 1.50 - 8.30 10*3/mm3    Lymphocytes, Absolute 2.46 0.60 - 4.80 10*3/mm3    Monocytes, Absolute 0.73 0.00 - 1.00 10*3/mm3    Eosinophils, Absolute 0.19 0.00 - 0.30 10*3/mm3    Basophils, Absolute 0.02 0.00 - 0.20 10*3/mm3    Immature Grans, Absolute 0.02 0.00 - 0.03 10*3/mm3   POC Troponin, Rapid    Collection Time: 09/06/18  8:08 AM   Result Value Ref Range    Troponin I 0.00 0.00 - 0.07 ng/mL   POC Troponin, Rapid    Collection Time: 09/06/18 10:00 AM   Result Value Ref Range    Troponin I 0.00 0.00 - 0.07 ng/mL   Stress Test With Myocardial Perfusion One Day    Collection Time: 09/06/18  2:49 PM   Result Value Ref Range    Target HR (85%) 147 bpm    Max. Pred. HR (100%) 173 bpm    BH CV STRESS PROTOCOL 1 Yves     Stage 1 1     Duration Min Stage 1 3     Duration Sec Stage 1 0     Grade Stage 1 10     Speed Stage 1 1.7     BH CV STRESS METS STAGE 1 5     Baseline HR 61 bpm    Baseline /90 mmHg    HR Stage 1 108     BP Stage 1 160/80     Stage 2 2     HR Stage 2 116     BP Stage 2 168/62     Duration Min Stage 2 3     Duration Sec Stage 2 0     Grade Stage 2 12     Speed Stage 2 2.5     BH CV STRESS METS STAGE 2 7.5     Stage 3 3     HR Stage 3 133     BP Stage 3 170/70     Duration Min Stage 3 3     Duration Sec Stage 3 0     Grade Stage 3 14     Speed Stage 3 3.4     BH CV STRESS METS STAGE 3 10.0     Stage 4 4     HR Stage 4 136     Duration Min Stage 4 1     Duration Sec Stage 4 30     Grade Stage 4 16     Speed Stage 4 4.2     BH CV STRESS METS STAGE 4 13.5     Exercise duration (min) 10 min    Exercise duration (sec) 30 sec    Peak  bpm    Percent Max Pred HR 88.44 %    Percent Target  %    Peak /60 mmHg    Recovery HR 86 bpm    Recovery /84 mmHg    Estimated workload 10.8  METS    Nuc Stress EF 58 %     Note: In addition to lab results from this visit, the labs listed above may include labs taken at another facility or during a different encounter within the last 24 hours. Please correlate lab times with ED admission and discharge times for further clarification of the services performed during this visit.    XR Chest 1 View   Final Result   No active disease.       D:  09/06/2018   E:  09/06/2018       This report was finalized on 9/6/2018 1:37 PM by Dr. Francisco Hammer MD.            Vitals:    09/06/18 1300 09/06/18 1330 09/06/18 1400 09/06/18 1600   BP: 131/87 126/84 110/70 128/69   Patient Position:       Pulse: 66 56 69 72   Resp:   16 16   Temp:       TempSrc:       SpO2: 98% 97% 96% 99%   Weight:       Height:         Medications   sodium chloride 0.9 % flush 10 mL (not administered)   atorvastatin (LIPITOR) tablet 80 mg (80 mg Oral Given 9/6/18 1248)   aspirin chewable tablet 324 mg (324 mg Oral Given 9/6/18 1009)   nitroglycerin (NITROSTAT) ointment 1 inch (1 inch Topical Given 9/6/18 1009)   aluminum-magnesium hydroxide-simethicone (MAALOX/MYLANTA) suspension 20 mL (20 mL Oral Given 9/6/18 1011)   lisinopril (PRINIVIL,ZESTRIL) tablet 20 mg (20 mg Oral Given 9/6/18 1051)   acetaminophen (TYLENOL) tablet 1,000 mg (1,000 mg Oral Given 9/6/18 1407)   technetium sestamibi (CARDIOLITE) injection 1 dose (1 dose Intravenous Given 9/6/18 1500)   technetium sestamibi (CARDIOLITE) injection 1 dose (1 dose Intravenous Given 9/6/18 1320)     ECG/EMG Results (last 24 hours)     Procedure Component Value Units Date/Time    ECG 12 Lead [[id number]] Collected:  09/06/18 0756     Updated:  09/06/18 0802    Narrative:       Test Reason : chest pain  Blood Pressure : **/** mmHG  Vent. Rate : 067 BPM     Atrial Rate : 067 BPM     P-R Int : 134 ms          QRS Dur : 096 ms      QT Int : 368 ms       P-R-T Axes : 032 064 047 degrees     QTc Int : 388 ms    Normal sinus rhythm  When compared with ECG  of 29-MAR-2014 00:10,  Vent. rate has decreased BY  54 BPM  Confirmed by ADARSH MALDONADO MD ([id number]) on 9/6/2018 8:02:38 AM    Referred By:  CHRISTIAN           Confirmed By:ADARSH MALDONADO MD                HEART Score (for prediction of 6-week risk of major adverse cardiac event) reviewed and/or performed as part of the patient evaluation and treatment planning process.  The result associated with this review/performance is: 4           MDM    Final diagnoses:   Atypical chest pain       Documentation assistance provided by gina Hirsch.  Information recorded by the scribe was done at my direction and has been verified and validated by me.     Heather Hirsch  09/06/18 5544       Heather Hirsch  09/06/18 8795       Adarsh Maldonado MD  09/06/18 2030

## 2018-09-06 NOTE — H&P
"    [facility]  HISTORY AND PHYSICAL NOTE      Date of Hospital Visit: 2018  Hospital Day:  LOS: 0 days     IDENTIFICATION:   Patient Name: Galo Jama  :1970    Primary Care Provider: Provider, No Known     Treatment Team:   Attending Provider: Adarsh Maldonado MD  Consulting Physician: Jayden Burleson MD    Referral Provider: No ref. provider found    Patient Active Problem List   Diagnosis   • Hypertension   • Tobacco use   • Reaction to chronic stress   • Chronic bilateral low back pain with bilateral sciatica       Past Medical History:   Diagnosis Date   • Depression    • Hyperlipidemia    • Hypertension    • Injury of back        Past Surgical History:   Procedure Laterality Date   • HAND TENDON REPAIR     • STEREOTACTIC PROCEDURE INTRACRANIAL / EXTRACRANIAL / SPINAL         Allergies as of 2018   • (No Known Allergies)       MEDICATIONS:  No current facility-administered medications on file prior to encounter.      Current Outpatient Prescriptions on File Prior to Encounter   Medication Sig Dispense Refill   • HYDROcodone-acetaminophen (NORCO) 7.5-325 MG per tablet Take 1 tablet by mouth Every 4 (Four) Hours As Needed for Moderate Pain . 16 tablet 0   • lisinopril (PRINIVIL,ZESTRIL) 10 MG tablet TAKE ONE TABLET BY MOUTH TWICE A DAY 60 tablet 0   • pravastatin (PRAVACHOL) 40 MG tablet TAKE ONE TABLET BY MOUTH DAILY 30 tablet 0        HISTORY OF PRESENT ILLNESS:  48 year old WM with history of HTN, HLP (both currently untreated), ongoing tobacco abuse, and family history of CAD who presents to the ED today with complaints of CP. He states he has been having on and off chest discomfort and SIMONS for several months while working as a . In the last couple of weeks, he has had on and off CP which has been more severe. This morning, while getting ready for the day, he had acute onset of severe CP, which he describes as a \"burning " "sensation\" in the center of his chest with radiation of pain to his left chest and arm with associated nausea, but no vomiting. No triggering factors. This lasted for 45 minutes before he came to the ED. He has been given Mylanta and NTG patch with some relief of his pain. He states that since he has been here, the pain comes and goes a few minutes at a time.  Currently, he is comfortable. As stated above, his HTN and HLP have been untreated for about 5-6 months. He states that he did not follow up with his PCP and they stopped refilling his medications. His BPs have been running high at home. He notes that he has had a LHC about 5 years ago due to chest pain with Dr. Boothe with no stenting done at that time. He also complains of leg pain with exertion, relieved with rest. He has a sensation of a rubber band wrapped around his upper thigh which comes and goes. He does continue to smoke 1 PPD since he was 13 years old.     CARDIAC RISK FACTORS:   family history of premature cardiovascular disease, hypertension, male gender and smoking/ tobacco exposure, hyperlipidemia.     Social History     Social History   • Marital status:      Spouse name: N/A   • Number of children: N/A   • Years of education: N/A     Occupational History   • Not on file.     Social History Main Topics   • Smoking status: Current Every Day Smoker     Packs/day: 1.00   • Smokeless tobacco: Not on file   • Alcohol use Yes      Comment: 2 a year   • Drug use: No   • Sexual activity: Defer     Other Topics Concern   • Not on file     Social History Narrative   • No narrative on file       Family History   Problem Relation Age of Onset   • Cancer Other    • Diabetes Other    • Hypertension Other    • Kidney disease Other    • Heart attack Other    • Hyperlipidemia Other    Father: CABG in his 60s and AAA repair, passed away from cancer  Brother: MI in his 50s  Mother: COPD    REVIEW OF SYSTEMS:   CONST:  No recent weight loss, fever, chills, " "weakness + fatigue.   HEENT:  No visual loss, blurred vision, double vision, yellow sclerae.                   No hearing loss, congestion, sore throat.   SKIN:      No rashes, urticaria, ulcers, sores.     RESP:     + shortness of breath, - hemoptysis, cough, sputum.   GI:           No anorexia, +nausea, - vomiting, diarrhea. No abdominal pain, melena.   :         No burning on urination, hematuria or increased frequency.  ENDO:    No diaphoresis, cold or heat intolerance. No polyuria or polydipsia.   NEURO:  No headache, dizziness, syncope, paralysis, ataxia, or parasthesias.                  No change in bowel or bladder control. No history of CVA/TIA  MUSC:    No muscle, back pain, joint pain or stiffness.   HEME:    No anemia, bleeding, bruising. No history of DVT/PE.  PSYCH:  No history of depression, anxiety, hallucinations.    All other systems reviewed and negative    PHYSICAL EXAM:   Flowsheet Rows      First Filed Value   Admission Height  182.9 cm (72\") Documented at 09/06/2018 0758   Admission Weight  87.5 kg (193 lb) Documented at 09/06/2018 0758        Body mass index is 26.18 kg/m².     Vital Sign Min/Max for last 24 hours  Temp  Min: 97.6 °F (36.4 °C)  Max: 97.6 °F (36.4 °C)   BP  Min: 151/100  Max: 172/102   Pulse  Min: 59  Max: 71   Resp  Min: 14  Max: 14   SpO2  Min: 95 %  Max: 98 %   No Data Recorded    No intake or output data in the 24 hours ending 09/06/18 1103        Gen:     In no acute distress. A & O x 3   Heent:    Normocephalic, atraumatic, conjunctivae/sclerae normal, no icterus.    Neck:   No carotid bruits. No thyromegaly, no JVD. No bruits   Resp:     Lungs clear to auscultation, respirations regular, even, unlabored    Heart:    Regular rhythm, normal rate, no murmurs, rubs, gallops, clicks. DP/PT 2+ bilaterally.   Abd:     Normal bowel sounds, no organomegaly, soft non-tender. No abdominal bruit.   Ext:   No pitting edema bilaterally, no cyanosis, no clubbing. 2+ pedal pulses "   Neuro:   Normal affect. Alert and oriented, CN II-XII grossly intact.   Skin:   No ecchymosis, no rashes, no ulcers  Psych:  Normal mood and affect     LAB DATA:   Lab Results   Component Value Date    GLUCOSE 105 (H) 09/06/2018    BUN 13 09/06/2018    CREATININE 0.84 09/06/2018    EGFRIFNONA 98 09/06/2018    BCR 15.5 09/06/2018    K 4.5 09/06/2018    CO2 29.0 09/06/2018    CALCIUM 8.8 09/06/2018    ALBUMIN 4.45 09/06/2018    AST 21 09/06/2018    ALT 15 09/06/2018     No results found for: CKTOTAL, CKMB, CKMBINDEX, TROPONINI, TROPONINT  Lab Results   Component Value Date    WBC 9.06 09/06/2018    HGB 14.7 09/06/2018    HCT 45.0 09/06/2018    MCV 87.2 09/06/2018     09/06/2018     Troponin: 0.00 x 2  BNP: 8  Lipase: 20    DIAGNOSTIC DATA:  EKG/TELE: NSR   ECG/EMG Results (last 24 hours)     Procedure Component Value Units Date/Time    ECG 12 Lead [[id number]] Collected:  09/06/18 0756     Updated:  09/06/18 0802    Narrative:       Test Reason : chest pain  Blood Pressure : **/** mmHG  Vent. Rate : 067 BPM     Atrial Rate : 067 BPM     P-R Int : 134 ms          QRS Dur : 096 ms      QT Int : 368 ms       P-R-T Axes : 032 064 047 degrees     QTc Int : 388 ms    Normal sinus rhythm  When compared with ECG of 29-MAR-2014 00:10,  Vent. rate has decreased BY  54 BPM  Confirmed by SUNITA GONZALES MD ([id number]) on 9/6/2018 8:02:38 AM    Referred By:  CHRISTIAN           Confirmed By:SUNITA GONZALES MD    ECG 12 Lead [[id number]] Collected:  09/06/18 1006     Updated:  09/06/18 1026    Narrative:       Test Reason : 2nd set 1000  Blood Pressure : **/** mmHG  Vent. Rate : 061 BPM     Atrial Rate : 061 BPM     P-R Int : 146 ms          QRS Dur : 092 ms      QT Int : 394 ms       P-R-T Axes : 026 054 039 degrees     QTc Int : 396 ms    Normal sinus rhythm  When compared with ECG of 06-SEP-2018 07:56,  No significant change was found  Confirmed by SUNITA GONZALES MD ([id number]) on 9/6/2018 10:26:17 AM    Referred By:  CHRISTIAN            Confirmed By:SUNITA GONZALES MD        EKGs reviewed show sinus rhythm with some early repolarization.  No acute ischemic changes  IMAGING:  Imaging Results (last 24 hours)     Procedure Component Value Units Date/Time    XR Chest 1 View [[id number]] Collected:  09/06/18 1024     Updated:  09/06/18 1025    Narrative:       EXAMINATION: XR CHEST 1 VW-09/06/2018:      INDICATION: Chest pain, triage protocol.      COMPARISON: 02/04/2016.     FINDINGS: There is a normal cardiac silhouette. There is no pulmonary  inflammatory process, mass or effusion.           Impression:       No active disease.     D:  09/06/2018  E:  09/06/2018                   I personally viewed and interpreted patient's EKG, Diagnostics, Labs, and Imaging data.        Assessment/Plan:   1. Chest Pain/Unstable Angina:   - patient with multiple risk factors for CAD including HTN, HLP, family history of CAD, and active tobacco abuse  - will plan for GXT Cardiolite today    2. HTN:   -uncontrolled  - will add home dose of Lisinopril 20 mg daily    3. HLP:   - check FLP, been off statin for 6 months  - add Atorvastatin 80 mg daily    4. Medical Noncompliance    5. Tobacco abuse:   -counseled cessation, offer Nicotine patch.         [facility]  9/6/2018   11:03 AM      I,Jayden Burleson M.D., personally performed the services described in this documentation as scribed by the above named individual in my presence, and it is both accurate and complete.  9/6/2018  4:34 PM     Physician Addendum:    Patient able to exercise for 10 minutes withiut ischemic EKG changes.  Cardiolite showed normal stress perfusion.  He did have a hypertensive response to exercise.  Will increase his lisinopril to 40mg on discharge and continue atorvastatin 80mg on discharge.  Will schedule outpatient HODA to evaluate for PAD.  Continue ASA 81mg pending HODA results.  Encouraged smoking cessation and medication compliance.  Follow up with  me in approximately 6 weeks.  Discussed findings and plan with the patient

## 2018-09-06 NOTE — DISCHARGE INSTRUCTIONS
You have new prescriptions for your blood pressure and cholesterol problems sent to your pharmacy by Dr. Burleson, as you know.  Start the lisinopril tomorrow morning.    Of course, you need to stop smoking.

## 2018-09-07 ENCOUNTER — HOSPITAL ENCOUNTER (OUTPATIENT)
Dept: CARDIOLOGY | Facility: HOSPITAL | Age: 48
Discharge: HOME | End: 2018-09-07
Attending: INTERNAL MEDICINE | Admitting: INTERNAL MEDICINE

## 2018-09-07 VITALS — HEIGHT: 72 IN | BODY MASS INDEX: 26.14 KG/M2 | WEIGHT: 193 LBS

## 2018-09-07 LAB
BH CV LOWER ARTERIAL LEFT ABI RATIO: 1.27
BH CV LOWER ARTERIAL LEFT DORSALIS PEDIS SYS MAX: 130 MMHG
BH CV LOWER ARTERIAL LEFT POST EX ABI RATIO: 1.31
BH CV LOWER ARTERIAL LEFT POST TIBIAL SYS MAX: 157 MMHG
BH CV LOWER ARTERIAL RIGHT ABI RATIO: 1.27
BH CV LOWER ARTERIAL RIGHT DORSALIS PEDIS SYS MAX: 124 MMHG
BH CV LOWER ARTERIAL RIGHT POST EX ABI RATIO: 1.32
BH CV LOWER ARTERIAL RIGHT POST TIBIAL SYS MAX: 157 MMHG
UPPER ARTERIAL LEFT ARM BRACHIAL SYS MAX: 124 MMHG
UPPER ARTERIAL RIGHT ARM BRACHIAL SYS MAX: 120 MMHG

## 2018-09-07 PROCEDURE — 93923 UPR/LXTR ART STDY 3+ LVLS: CPT

## 2018-09-07 PROCEDURE — 93923 UPR/LXTR ART STDY 3+ LVLS: CPT | Performed by: INTERNAL MEDICINE

## 2019-01-11 ENCOUNTER — OFFICE VISIT (OUTPATIENT)
Dept: FAMILY MEDICINE CLINIC | Facility: CLINIC | Age: 49
End: 2019-01-11

## 2019-01-11 VITALS
TEMPERATURE: 98.4 F | BODY MASS INDEX: 26.55 KG/M2 | SYSTOLIC BLOOD PRESSURE: 122 MMHG | HEART RATE: 79 BPM | WEIGHT: 196 LBS | HEIGHT: 72 IN | OXYGEN SATURATION: 99 % | DIASTOLIC BLOOD PRESSURE: 78 MMHG

## 2019-01-11 DIAGNOSIS — J02.9 PHARYNGITIS, UNSPECIFIED ETIOLOGY: Primary | ICD-10-CM

## 2019-01-11 DIAGNOSIS — R13.10 DYSPHAGIA, UNSPECIFIED TYPE: ICD-10-CM

## 2019-01-11 DIAGNOSIS — Z72.0 TOBACCO USE: ICD-10-CM

## 2019-01-11 PROBLEM — F19.11 HISTORY OF SUBSTANCE ABUSE: Status: ACTIVE | Noted: 2019-01-11

## 2019-01-11 PROCEDURE — 99203 OFFICE O/P NEW LOW 30 MIN: CPT | Performed by: PHYSICIAN ASSISTANT

## 2019-01-11 RX ORDER — MIRTAZAPINE 15 MG/1
TABLET, FILM COATED ORAL
COMMUNITY
Start: 2019-01-05 | End: 2019-09-05 | Stop reason: SDUPTHER

## 2019-01-11 RX ORDER — HYDROXYZINE PAMOATE 50 MG/1
CAPSULE ORAL
COMMUNITY
Start: 2018-12-13 | End: 2019-09-05 | Stop reason: SDUPTHER

## 2019-01-11 RX ORDER — AMOXICILLIN 875 MG/1
875 TABLET, COATED ORAL 2 TIMES DAILY
Qty: 14 TABLET | Refills: 0 | Status: SHIPPED | OUTPATIENT
Start: 2019-01-11 | End: 2019-09-05

## 2019-01-11 RX ORDER — TRAZODONE HYDROCHLORIDE 50 MG/1
TABLET ORAL
COMMUNITY
Start: 2018-12-13 | End: 2019-09-05

## 2019-01-11 RX ORDER — BUPRENORPHINE HYDROCHLORIDE AND NALOXONE HYDROCHLORIDE DIHYDRATE 8; 2 MG/1; MG/1
TABLET SUBLINGUAL
COMMUNITY
Start: 2019-01-05

## 2019-01-11 NOTE — PROGRESS NOTES
Subjective   Galo Jama is a 48 y.o. male  Establish Care (New patient Establish Care, Previous PCP Dr. Perez ); Sore Throat (Sore throat, food gets stuck in throat x2 weeks ); and Adenopathy (Swollen gland on left side of neck x2 weeks )      History of Present Illness  Patient comes in to see us today as a new patient to our practice to be established in our practice and also for evaluation of ongoing persistent worsening symptoms of soreness in his neck and the back of his throat on the right side for the past 2 weeks as well as intermittent difficulty worsening with sensation of choking when he swallows food with food getting lodged in his throat for the past 2 months.  No history of this in the past these are both new problems.  The following portions of the patient's history were reviewed and updated as appropriate: allergies, current medications, past social history and problem list    Review of Systems   Constitutional: Negative for fatigue and fever.   HENT: Positive for sore throat and trouble swallowing. Negative for voice change.    Respiratory: Negative for apnea and shortness of breath.    Gastrointestinal: Negative.    Musculoskeletal: Positive for neck pain.   Skin: Negative.    Neurological: Negative for headaches.   Hematological: Positive for adenopathy.       Objective     Vitals:    01/11/19 0855   BP: 122/78   Pulse: 79   Temp: 98.4 °F (36.9 °C)   SpO2: 99%       Physical Exam   Constitutional: He appears well-developed and well-nourished. No distress.   HENT:   Head: Normocephalic and atraumatic.   Right Ear: External ear normal.   Left Ear: External ear normal.   Nose: Nose normal.   Mouth/Throat: Uvula is midline and oropharynx is clear and moist. He does not have dentures. No oral lesions. No trismus in the jaw. Abnormal dentition. Dental caries present. No dental abscesses, uvula swelling or lacerations. No oropharyngeal exudate.   Eyes: Conjunctivae are normal. Right eye exhibits  no discharge. Left eye exhibits no discharge.   Neck: Normal range of motion. Neck supple.   Cardiovascular: Normal rate, regular rhythm and normal heart sounds.   Pulmonary/Chest: Effort normal and breath sounds normal. No respiratory distress.   Lymphadenopathy:     He has no cervical adenopathy.   Skin: Skin is warm and dry. He is not diaphoretic.   Nursing note and vitals reviewed.      Assessment/Plan     Diagnoses and all orders for this visit:    Pharyngitis, unspecified etiology    Dysphagia, unspecified type    Tobacco use    Other orders  -     buprenorphine-naloxone (SUBOXONE) 8-2 MG per SL tablet;   -     hydrOXYzine (VISTARIL) 50 MG capsule;   -     mirtazapine (REMERON) 15 MG tablet;   -     traZODone (DESYREL) 50 MG tablet;   -     amoxicillin (AMOXIL) 875 MG tablet; Take 1 tablet by mouth 2 (Two) Times a Day.    If ENT evaluation nondiagnostic will refer to Dr. Roberts for EGD.

## 2019-09-05 ENCOUNTER — LAB (OUTPATIENT)
Dept: LAB | Facility: HOSPITAL | Age: 49
End: 2019-09-05

## 2019-09-05 ENCOUNTER — OFFICE VISIT (OUTPATIENT)
Dept: FAMILY MEDICINE CLINIC | Facility: CLINIC | Age: 49
End: 2019-09-05

## 2019-09-05 VITALS
DIASTOLIC BLOOD PRESSURE: 94 MMHG | OXYGEN SATURATION: 98 % | SYSTOLIC BLOOD PRESSURE: 142 MMHG | BODY MASS INDEX: 27.5 KG/M2 | WEIGHT: 203 LBS | HEIGHT: 72 IN | TEMPERATURE: 98 F | HEART RATE: 84 BPM

## 2019-09-05 DIAGNOSIS — R73.9 HYPERGLYCEMIA: ICD-10-CM

## 2019-09-05 DIAGNOSIS — E16.2 HYPOGLYCEMIA: ICD-10-CM

## 2019-09-05 DIAGNOSIS — E78.5 HYPERLIPIDEMIA, UNSPECIFIED HYPERLIPIDEMIA TYPE: ICD-10-CM

## 2019-09-05 DIAGNOSIS — Z00.00 GENERAL MEDICAL EXAM: Primary | ICD-10-CM

## 2019-09-05 DIAGNOSIS — I10 ESSENTIAL HYPERTENSION: ICD-10-CM

## 2019-09-05 LAB
ALBUMIN SERPL-MCNC: 4.4 G/DL (ref 3.5–5.2)
ALBUMIN/GLOB SERPL: 1.8 G/DL
ALP SERPL-CCNC: 101 U/L (ref 39–117)
ALT SERPL W P-5'-P-CCNC: 10 U/L (ref 1–41)
ANION GAP SERPL CALCULATED.3IONS-SCNC: 9.2 MMOL/L (ref 5–15)
AST SERPL-CCNC: 15 U/L (ref 1–40)
BASOPHILS # BLD AUTO: 0.03 10*3/MM3 (ref 0–0.2)
BASOPHILS NFR BLD AUTO: 0.4 % (ref 0–1.5)
BILIRUB SERPL-MCNC: 0.3 MG/DL (ref 0.2–1.2)
BUN BLD-MCNC: 14 MG/DL (ref 6–20)
BUN/CREAT SERPL: 14.6 (ref 7–25)
CALCIUM SPEC-SCNC: 9.8 MG/DL (ref 8.6–10.5)
CHLORIDE SERPL-SCNC: 101 MMOL/L (ref 98–107)
CHOLEST SERPL-MCNC: 112 MG/DL (ref 0–200)
CO2 SERPL-SCNC: 25.8 MMOL/L (ref 22–29)
CREAT BLD-MCNC: 0.96 MG/DL (ref 0.76–1.27)
DEPRECATED RDW RBC AUTO: 43 FL (ref 37–54)
EOSINOPHIL # BLD AUTO: 0.12 10*3/MM3 (ref 0–0.4)
EOSINOPHIL NFR BLD AUTO: 1.5 % (ref 0.3–6.2)
ERYTHROCYTE [DISTWIDTH] IN BLOOD BY AUTOMATED COUNT: 13.7 % (ref 12.3–15.4)
GFR SERPL CREATININE-BSD FRML MDRD: 84 ML/MIN/1.73
GLOBULIN UR ELPH-MCNC: 2.5 GM/DL
GLUCOSE BLD-MCNC: 107 MG/DL (ref 65–99)
GLUCOSE BLDC GLUCOMTR-MCNC: 129 MG/DL (ref 70–130)
HBA1C MFR BLD: 5.9 %
HCT VFR BLD AUTO: 46.6 % (ref 37.5–51)
HDLC SERPL-MCNC: 30 MG/DL (ref 40–60)
HGB BLD-MCNC: 14.3 G/DL (ref 13–17.7)
IMM GRANULOCYTES # BLD AUTO: 0.02 10*3/MM3 (ref 0–0.05)
IMM GRANULOCYTES NFR BLD AUTO: 0.3 % (ref 0–0.5)
LDLC SERPL CALC-MCNC: 61 MG/DL (ref 0–100)
LDLC/HDLC SERPL: 2.03 {RATIO}
LYMPHOCYTES # BLD AUTO: 2.41 10*3/MM3 (ref 0.7–3.1)
LYMPHOCYTES NFR BLD AUTO: 30.2 % (ref 19.6–45.3)
MCH RBC QN AUTO: 26.4 PG (ref 26.6–33)
MCHC RBC AUTO-ENTMCNC: 30.7 G/DL (ref 31.5–35.7)
MCV RBC AUTO: 86 FL (ref 79–97)
MONOCYTES # BLD AUTO: 0.6 10*3/MM3 (ref 0.1–0.9)
MONOCYTES NFR BLD AUTO: 7.5 % (ref 5–12)
NEUTROPHILS # BLD AUTO: 4.81 10*3/MM3 (ref 1.7–7)
NEUTROPHILS NFR BLD AUTO: 60.1 % (ref 42.7–76)
NRBC BLD AUTO-RTO: 0 /100 WBC (ref 0–0.2)
PLATELET # BLD AUTO: 199 10*3/MM3 (ref 140–450)
PMV BLD AUTO: 9.8 FL (ref 6–12)
POTASSIUM BLD-SCNC: 4.5 MMOL/L (ref 3.5–5.2)
PROT SERPL-MCNC: 6.9 G/DL (ref 6–8.5)
RBC # BLD AUTO: 5.42 10*6/MM3 (ref 4.14–5.8)
SODIUM BLD-SCNC: 136 MMOL/L (ref 136–145)
TRIGL SERPL-MCNC: 106 MG/DL (ref 0–150)
VLDLC SERPL-MCNC: 21.2 MG/DL (ref 5–40)
WBC NRBC COR # BLD: 7.99 10*3/MM3 (ref 3.4–10.8)

## 2019-09-05 PROCEDURE — 85025 COMPLETE CBC W/AUTO DIFF WBC: CPT

## 2019-09-05 PROCEDURE — 83036 HEMOGLOBIN GLYCOSYLATED A1C: CPT | Performed by: PHYSICIAN ASSISTANT

## 2019-09-05 PROCEDURE — 82962 GLUCOSE BLOOD TEST: CPT | Performed by: PHYSICIAN ASSISTANT

## 2019-09-05 PROCEDURE — 36415 COLL VENOUS BLD VENIPUNCTURE: CPT

## 2019-09-05 PROCEDURE — 99396 PREV VISIT EST AGE 40-64: CPT | Performed by: PHYSICIAN ASSISTANT

## 2019-09-05 PROCEDURE — 80061 LIPID PANEL: CPT | Performed by: PHYSICIAN ASSISTANT

## 2019-09-05 PROCEDURE — 80053 COMPREHEN METABOLIC PANEL: CPT

## 2019-09-05 RX ORDER — ESOMEPRAZOLE MAGNESIUM 40 MG/1
40 CAPSULE, DELAYED RELEASE ORAL
Qty: 30 CAPSULE | Refills: 11 | Status: SHIPPED | OUTPATIENT
Start: 2019-09-05 | End: 2023-01-05

## 2019-09-05 RX ORDER — HYDROXYZINE PAMOATE 50 MG/1
CAPSULE ORAL
Qty: 60 CAPSULE | Refills: 11 | Status: SHIPPED | OUTPATIENT
Start: 2019-09-05

## 2019-09-05 RX ORDER — ESOMEPRAZOLE MAGNESIUM 40 MG/1
CAPSULE, DELAYED RELEASE ORAL
COMMUNITY
End: 2019-09-05 | Stop reason: SDUPTHER

## 2019-09-05 RX ORDER — MIRTAZAPINE 15 MG/1
15 TABLET, FILM COATED ORAL NIGHTLY
Qty: 30 TABLET | Refills: 11 | Status: SHIPPED | OUTPATIENT
Start: 2019-09-05 | End: 2020-09-08 | Stop reason: SDUPTHER

## 2019-09-05 RX ORDER — ATORVASTATIN CALCIUM 80 MG/1
80 TABLET, FILM COATED ORAL DAILY
Qty: 30 TABLET | Refills: 11 | Status: SHIPPED | OUTPATIENT
Start: 2019-09-05 | End: 2020-09-08 | Stop reason: SDUPTHER

## 2019-09-05 RX ORDER — LISINOPRIL 40 MG/1
40 TABLET ORAL DAILY
Qty: 30 TABLET | Refills: 11 | Status: SHIPPED | OUTPATIENT
Start: 2019-09-05 | End: 2020-08-28

## 2019-09-05 NOTE — PROGRESS NOTES
Subjective   Galo Jama is a 49 y.o. male  Hypertension (refills on lisinopril and hydroxyzine); Edema (BLE X several months.  Staying swollen especially at night); Hyperglycemia (wants to be checked for DM  FM/H/O DM   tingling/aching in feet and having cold sweats); Hyperlipidemia (refills on atorvastatin); and Annual Exam      History of Present Illness  Patient presents today for a preventive medical visit.  Patient is here to determine screening labs and tests that are due and to determine immunization status as well.  Patient will be counseled regarding preventative medicine issues such as regular exercise and  healthy diet as well.  The following portions of the patient's history were reviewed and updated as appropriate: allergies, current medications, past social history and problem list    Review of Systems   Constitutional: Negative.    HENT: Negative.    Eyes: Negative.    Respiratory: Negative.    Cardiovascular: Negative.    Gastrointestinal: Negative.    Endocrine: Negative.    Genitourinary: Negative.    Musculoskeletal: Negative.    Skin: Negative.    Allergic/Immunologic: Negative.    Neurological: Positive for tremors ( Patient has been experiencing some shakiness and lightheadedness after meals).   Hematological: Negative.    Psychiatric/Behavioral: Negative.    All other systems reviewed and are negative.      Objective     Vitals:    09/05/19 0949   BP: 142/94   Pulse: 84   Temp: 98 °F (36.7 °C)   SpO2: 98%       Physical Exam   Constitutional: He is oriented to person, place, and time. He appears well-developed and well-nourished. No distress.   HENT:   Head: Normocephalic and atraumatic.   No Exopthalmos   Eyes: Conjunctivae are normal.   Neck: No JVD present. No thyromegaly present.   Cardiovascular: Normal rate, regular rhythm, normal heart sounds and intact distal pulses.   No murmur heard.  Pulses:       Dorsalis pedis pulses are 2+ on the right side, and 2+ on the left side.         Posterior tibial pulses are 2+ on the right side, and 2+ on the left side.   Pulmonary/Chest: Effort normal and breath sounds normal. No respiratory distress. He exhibits no tenderness.   Abdominal: Soft. He exhibits no distension. There is no tenderness.   Musculoskeletal: He exhibits no edema.   Lymphadenopathy:     He has no cervical adenopathy.   Neurological: He is alert and oriented to person, place, and time. Coordination normal.   Skin: Skin is warm and dry. No rash noted. He is not diaphoretic. No erythema. No pallor.   Psychiatric: He has a normal mood and affect. His behavior is normal.   Nursing note and vitals reviewed.    Discussed preventative medicine issues with patient including regular exercise, healthy diet, stress reduction, adequate sleep and recommended age-appropriate screening studies.  Assessment/Plan     Diagnoses and all orders for this visit:    General medical exam    Hyperglycemia  -     POC Glycosylated Hemoglobin (Hb A1C)  -     POC Glucose  -     Comprehensive metabolic panel; Future  -     CBC & Differential; Future  -     Lipid Panel    Essential hypertension    Hyperlipidemia, unspecified hyperlipidemia type  -     Lipid Panel    Hypoglycemia    Other orders  -     Discontinue: esomeprazole (NEXIUM) 40 MG capsule; Nexium 40 mg capsule,delayed release   Take 1 capsule every day by oral route for 30 days.  -     mirtazapine (REMERON) 15 MG tablet; Take 1 tablet by mouth Every Night.  -     lisinopril (PRINIVIL,ZESTRIL) 40 MG tablet; Take 1 tablet by mouth Daily.  -     hydrOXYzine pamoate (VISTARIL) 50 MG capsule; Take one twice daily for anxiety  -     esomeprazole (NEXIUM) 40 MG capsule; Take 1 capsule by mouth Every Morning Before Breakfast.  -     atorvastatin (LIPITOR) 80 MG tablet; Take 1 tablet by mouth Daily.    Will send letter on lab results when I receive them, prescription given for glucometer to check blood sugar if feeling symptoms of shakiness and jitteriness and  also recommended checking blood sugar daily for the next 2 weeks.

## 2020-08-28 RX ORDER — LISINOPRIL 40 MG/1
TABLET ORAL
Qty: 14 TABLET | Refills: 0 | Status: SHIPPED | OUTPATIENT
Start: 2020-08-28 | End: 2020-09-08 | Stop reason: SDUPTHER

## 2020-09-08 ENCOUNTER — OFFICE VISIT (OUTPATIENT)
Dept: FAMILY MEDICINE CLINIC | Facility: CLINIC | Age: 50
End: 2020-09-08

## 2020-09-08 VITALS
BODY MASS INDEX: 29.54 KG/M2 | HEIGHT: 71 IN | SYSTOLIC BLOOD PRESSURE: 140 MMHG | HEART RATE: 81 BPM | TEMPERATURE: 97.9 F | WEIGHT: 211 LBS | OXYGEN SATURATION: 98 % | DIASTOLIC BLOOD PRESSURE: 72 MMHG

## 2020-09-08 DIAGNOSIS — R09.89 THROAT TIGHTNESS: ICD-10-CM

## 2020-09-08 DIAGNOSIS — E78.5 HYPERLIPIDEMIA, UNSPECIFIED HYPERLIPIDEMIA TYPE: ICD-10-CM

## 2020-09-08 DIAGNOSIS — K21.9 GASTROESOPHAGEAL REFLUX DISEASE, ESOPHAGITIS PRESENCE NOT SPECIFIED: ICD-10-CM

## 2020-09-08 DIAGNOSIS — Z72.0 TOBACCO USE: ICD-10-CM

## 2020-09-08 DIAGNOSIS — I10 ESSENTIAL HYPERTENSION: Primary | ICD-10-CM

## 2020-09-08 PROCEDURE — 99214 OFFICE O/P EST MOD 30 MIN: CPT | Performed by: PHYSICIAN ASSISTANT

## 2020-09-08 RX ORDER — MIRTAZAPINE 30 MG/1
TABLET, FILM COATED ORAL
COMMUNITY
Start: 2020-08-18 | End: 2021-04-13 | Stop reason: ALTCHOICE

## 2020-09-08 RX ORDER — TRAZODONE HYDROCHLORIDE 50 MG/1
TABLET ORAL
COMMUNITY
End: 2020-09-08

## 2020-09-08 RX ORDER — LISINOPRIL 40 MG/1
40 TABLET ORAL DAILY
Qty: 30 TABLET | Refills: 11 | Status: SHIPPED | OUTPATIENT
Start: 2020-09-08 | End: 2021-09-09 | Stop reason: SDUPTHER

## 2020-09-08 RX ORDER — ATORVASTATIN CALCIUM 80 MG/1
80 TABLET, FILM COATED ORAL DAILY
Qty: 30 TABLET | Refills: 11 | Status: SHIPPED | OUTPATIENT
Start: 2020-09-08 | End: 2021-09-09 | Stop reason: SDUPTHER

## 2020-09-09 NOTE — PROGRESS NOTES
Subjective   Galo Jama is a 50 y.o. male  Hypertension (Follow up on BP, req refill on lisinopril ); Hyperlipidemia (Follow up cholesterol, req refills on Lipitor ); and Throat tightness (tightness in throat x2 months )      History of Present Illness  Patient is a pleasant 50-year-old white male who is following up today on hypertension, hyperlipidemia and also for evaluation of a new problem which is of episodic sensation of tightening in his throat for the last 2 months.  States it occurs at least once or twice a day, denies any difficulty swallowing solid or liquid substances.  No hoarseness, no sore throat.  Patient does have GERD but states his symptoms have been stable on Nexium daily.  He states typically happens when at rest.  He does not feel anxious.  Denies any masses in his neck.  No fevers.  No nausea or vomiting.  He states sometimes he can reposition his neck and it helps improve the sensation but it feels like a choking sensation when it occurs.  The following portions of the patient's history were reviewed and updated as appropriate: allergies, current medications, past social history and problem list    Review of Systems   Constitutional: Negative for fatigue and unexpected weight change.   HENT: Negative.  Negative for mouth sores, sore throat, trouble swallowing and voice change.    Respiratory: Negative for cough, chest tightness and shortness of breath.    Cardiovascular: Negative for chest pain, palpitations and leg swelling.   Gastrointestinal: Negative.  Negative for nausea.   Genitourinary: Negative.    Musculoskeletal: Positive for neck pain.   Skin: Negative for color change and rash.   Allergic/Immunologic: Negative for immunocompromised state.   Neurological: Negative for dizziness, syncope, weakness and headaches.   Hematological: Negative for adenopathy.       Objective     Vitals:    09/08/20 1514   BP: 140/72   Pulse: 81   Temp: 97.9 °F (36.6 °C)   SpO2: 98%       Physical  Exam   Constitutional: He appears well-developed and well-nourished. No distress.   HENT:   Head: Normocephalic and atraumatic.   Mouth/Throat: Oropharynx is clear and moist.   No Exopthalmos   Neck: Normal range of motion. Neck supple. No JVD present. No tracheal deviation present. No thyromegaly present.   Cardiovascular: Normal rate, regular rhythm, normal heart sounds and intact distal pulses.   No murmur heard.  Pulmonary/Chest: Effort normal and breath sounds normal. No respiratory distress. He exhibits no tenderness.   Abdominal: Soft. He exhibits no distension. There is no tenderness.   Musculoskeletal: He exhibits no edema.   Lymphadenopathy:     He has no cervical adenopathy.   Skin: Skin is warm and dry. No rash noted. He is not diaphoretic. No erythema. No pallor.   Psychiatric: He has a normal mood and affect. His behavior is normal. Judgment and thought content normal.   Nursing note and vitals reviewed.      Assessment/Plan     Galo was seen today for hypertension, hyperlipidemia and throat tightness.    Diagnoses and all orders for this visit:    Essential hypertension  -     Lipid Panel; Future  -     Basic metabolic panel; Future    Hyperlipidemia, unspecified hyperlipidemia type    Throat tightness  -     Ambulatory Referral to ENT (Otolaryngology)    Tobacco use    Gastroesophageal reflux disease, esophagitis presence not specified    Other orders  -     atorvastatin (LIPITOR) 80 MG tablet; Take 1 tablet by mouth Daily.  -     lisinopril (PRINIVIL,ZESTRIL) 40 MG tablet; Take 1 tablet by mouth Daily.    Discussed with patient that we may need to refer him for an EGD for further evaluation of his /throat tightness sensation but will start with referral to ENT.  Recommend smoking cessation as well

## 2021-04-07 ENCOUNTER — IMMUNIZATION (OUTPATIENT)
Dept: VACCINE CLINIC | Facility: HOSPITAL | Age: 51
End: 2021-04-07

## 2021-04-07 PROCEDURE — 91300 HC SARSCOV02 VAC 30MCG/0.3ML IM: CPT | Performed by: INTERNAL MEDICINE

## 2021-04-07 PROCEDURE — 0001A: CPT | Performed by: INTERNAL MEDICINE

## 2021-04-13 ENCOUNTER — OFFICE VISIT (OUTPATIENT)
Dept: FAMILY MEDICINE CLINIC | Facility: CLINIC | Age: 51
End: 2021-04-13

## 2021-04-13 VITALS
DIASTOLIC BLOOD PRESSURE: 82 MMHG | BODY MASS INDEX: 30.6 KG/M2 | RESPIRATION RATE: 14 BRPM | OXYGEN SATURATION: 98 % | HEIGHT: 71 IN | WEIGHT: 218.6 LBS | SYSTOLIC BLOOD PRESSURE: 138 MMHG | TEMPERATURE: 97.1 F | HEART RATE: 71 BPM

## 2021-04-13 DIAGNOSIS — I10 ESSENTIAL HYPERTENSION: Primary | ICD-10-CM

## 2021-04-13 DIAGNOSIS — M54.41 CHRONIC BILATERAL LOW BACK PAIN WITH BILATERAL SCIATICA: ICD-10-CM

## 2021-04-13 DIAGNOSIS — G89.29 CHRONIC BILATERAL LOW BACK PAIN WITH BILATERAL SCIATICA: ICD-10-CM

## 2021-04-13 DIAGNOSIS — Z01.818 PRE-OPERATIVE CLEARANCE: ICD-10-CM

## 2021-04-13 DIAGNOSIS — M54.42 CHRONIC BILATERAL LOW BACK PAIN WITH BILATERAL SCIATICA: ICD-10-CM

## 2021-04-13 PROCEDURE — 99212 OFFICE O/P EST SF 10 MIN: CPT | Performed by: PHYSICIAN ASSISTANT

## 2021-04-13 RX ORDER — TRAZODONE HYDROCHLORIDE 50 MG/1
TABLET ORAL
COMMUNITY
Start: 2021-03-02 | End: 2021-10-08 | Stop reason: SDUPTHER

## 2021-04-13 NOTE — PROGRESS NOTES
Subjective   Galo Jama is a 50 y.o. male  Pre-op Exam (Oral surgery by Dr. Paco Werner-fax clearance to [phone] )  Hypertension    History of Present Illness  Patient is a pleasant 50-year-old white female who presents for management of hypertension and preop clearance he has chronic back pain currently doing well no problems or complaints  The following portions of the patient's history were reviewed and updated as appropriate: allergies, current medications, past social history and problem list    Review of Systems   Constitutional: Negative for fatigue and unexpected weight change.   Respiratory: Negative for cough, chest tightness and shortness of breath.    Cardiovascular: Negative for chest pain, palpitations and leg swelling.   Gastrointestinal: Negative for nausea.   Skin: Negative for color change and rash.   Neurological: Negative for dizziness, syncope, weakness and headaches.       Objective     Vitals:    04/13/21 1003   BP: 138/82   Pulse: 71   Resp: 14   Temp: 97.1 °F (36.2 °C)   SpO2: 98%       Physical Exam  Vitals and nursing note reviewed.   Constitutional:       Appearance: He is well-developed. He is not diaphoretic.   Neck:      Thyroid: No thyromegaly.      Vascular: No JVD.   Cardiovascular:      Rate and Rhythm: Normal rate and regular rhythm.      Pulses: Normal pulses.      Heart sounds: Normal heart sounds. No murmur heard.     Pulmonary:      Effort: Pulmonary effort is normal. No respiratory distress.      Breath sounds: Normal breath sounds.   Abdominal:      General: Bowel sounds are normal.      Palpations: Abdomen is soft.      Tenderness: There is no abdominal tenderness.   Musculoskeletal:      Cervical back: Neck supple.   Lymphadenopathy:      Cervical: No cervical adenopathy.   Skin:     General: Skin is warm and dry.   Neurological:      Sensory: No sensory deficit.         Assessment/Plan     Diagnoses and all orders for this visit:    1. Essential  hypertension (Primary)    2. Chronic bilateral low back pain with bilateral sciatica    3. Pre-operative clearance    #1 continue all medications     #2 cleared for surgery    Discussed with patient chronic medical conditions, cleared him for surgery

## 2021-04-28 ENCOUNTER — IMMUNIZATION (OUTPATIENT)
Dept: VACCINE CLINIC | Facility: HOSPITAL | Age: 51
End: 2021-04-28

## 2021-04-28 PROCEDURE — 91300 HC SARSCOV02 VAC 30MCG/0.3ML IM: CPT | Performed by: INTERNAL MEDICINE

## 2021-04-28 PROCEDURE — 0002A: CPT | Performed by: INTERNAL MEDICINE

## 2021-09-05 RX ORDER — LISINOPRIL 40 MG/1
40 TABLET ORAL DAILY
Qty: 30 TABLET | Refills: 11 | Status: CANCELLED | OUTPATIENT
Start: 2021-09-05

## 2021-09-09 RX ORDER — LISINOPRIL 40 MG/1
40 TABLET ORAL DAILY
Qty: 30 TABLET | Refills: 11 | Status: SHIPPED | OUTPATIENT
Start: 2021-09-09 | End: 2022-08-28 | Stop reason: SDUPTHER

## 2021-09-09 NOTE — TELEPHONE ENCOUNTER
Caller: Galo Jama    Relationship: Self    Best call back number: [phone]    Medication needed:   Requested Prescriptions     Pending Prescriptions Disp Refills   • lisinopril (PRINIVIL,ZESTRIL) 40 MG tablet 30 tablet 11     Sig: Take 1 tablet by mouth Daily.       When do you need the refill by: ASAP    What additional details did the patient provide when requesting the medication: PATIENT STATES THAT HE IS COMPLETLEY OUT OF THIS MEDICATION.    Does the patient have less than a 3 day supply:  [x] Yes  [] No    What is the patient's preferred pharmacy: [facility]

## 2021-09-10 RX ORDER — ATORVASTATIN CALCIUM 80 MG/1
80 TABLET, FILM COATED ORAL DAILY
Qty: 30 TABLET | Refills: 1 | Status: SHIPPED | OUTPATIENT
Start: 2021-09-10 | End: 2021-11-05 | Stop reason: SDUPTHER

## 2021-10-08 ENCOUNTER — OFFICE VISIT (OUTPATIENT)
Dept: FAMILY MEDICINE CLINIC | Facility: CLINIC | Age: 51
End: 2021-10-08

## 2021-10-08 VITALS
OXYGEN SATURATION: 99 % | RESPIRATION RATE: 16 BRPM | SYSTOLIC BLOOD PRESSURE: 122 MMHG | DIASTOLIC BLOOD PRESSURE: 80 MMHG | WEIGHT: 195.8 LBS | BODY MASS INDEX: 27.41 KG/M2 | TEMPERATURE: 96.6 F | HEIGHT: 71 IN | HEART RATE: 69 BPM

## 2021-10-08 DIAGNOSIS — R07.9 CHEST PAIN, UNSPECIFIED TYPE: ICD-10-CM

## 2021-10-08 DIAGNOSIS — R53.83 FATIGUE, UNSPECIFIED TYPE: Primary | ICD-10-CM

## 2021-10-08 DIAGNOSIS — R42 DIZZINESS: ICD-10-CM

## 2021-10-08 PROCEDURE — 99214 OFFICE O/P EST MOD 30 MIN: CPT | Performed by: PHYSICIAN ASSISTANT

## 2021-10-08 PROCEDURE — 93000 ELECTROCARDIOGRAM COMPLETE: CPT | Performed by: PHYSICIAN ASSISTANT

## 2021-10-08 RX ORDER — MECLIZINE HYDROCHLORIDE 25 MG/1
25 TABLET ORAL 3 TIMES DAILY PRN
Qty: 21 TABLET | Refills: 0 | Status: SHIPPED | OUTPATIENT
Start: 2021-10-08

## 2021-10-08 NOTE — PROGRESS NOTES
"Mat Jama is a 51 y.o. male  Dizziness (pt c/o feeling dizzy, lightheaded and hot \"from the inside\" for a few weeks, has checked glucose and BP and both are normal)      History of Present Illness  Patient is a 51-year-old male who comes in with complaining of couple complaints first is nonspecific chest pain pressure in chest on occasion with exertion patient states that he is a smoker and smoked for many years feels tired fatigue no energy dizzy blood pressure has been normal fatigue is worse in the mornings  The following portions of the patient's history were reviewed and updated as appropriate: allergies, current medications, past social history and problem list    Review of Systems   Constitutional: Negative for fatigue and unexpected weight change.   Respiratory: Positive for shortness of breath. Negative for cough and chest tightness.    Cardiovascular: Positive for chest pain. Negative for palpitations and leg swelling.   Gastrointestinal: Negative for nausea.   Skin: Negative for color change and rash.   Neurological: Negative for dizziness, syncope, weakness and headaches.       Objective     Vitals:    10/08/21 1531   BP: 122/80   Pulse: 69   Resp: 16   Temp: 96.6 °F (35.9 °C)   SpO2: 99%       Physical Exam  Vitals and nursing note reviewed.   Constitutional:       Appearance: He is well-developed. He is not diaphoretic.   Neck:      Thyroid: No thyromegaly.      Vascular: No JVD.   Cardiovascular:      Rate and Rhythm: Normal rate and regular rhythm.      Pulses: Normal pulses.      Heart sounds: Normal heart sounds. No murmur heard.     Pulmonary:      Effort: Pulmonary effort is normal. No respiratory distress.      Breath sounds: Normal breath sounds.   Abdominal:      General: Bowel sounds are normal.      Palpations: Abdomen is soft.      Tenderness: There is no abdominal tenderness.   Musculoskeletal:      Cervical back: Neck supple.   Lymphadenopathy:      Cervical: No " cervical adenopathy.   Skin:     General: Skin is warm and dry.   Neurological:      Sensory: No sensory deficit.       ECG 12 Lead    Date/Time: 10/8/2021 4:13 PM  Performed by: Ad Gomez PA  Authorized by: Ad Gomez PA   Comparison: not compared with previous ECG   Rhythm: sinus rhythm, sinus bradycardia and atrial fibrillation  Conduction: conduction normal  ST Segments: ST segments normal  Other findings: non-specific ST-T wave changes            Assessment/Plan     Diagnoses and all orders for this visit:    1. Fatigue, unspecified type (Primary)  -     Comprehensive metabolic panel; Future  -     TSH; Future  -     CBC (No Diff); Future    2. Chest pain, unspecified type  -     Treadmill Stress Test; Future  -     ECG 12 Lead    3. Dizziness  -     Duplex Carotid Ultrasound CAR; Future  -     meclizine (ANTIVERT) 25 MG tablet; Take 1 tablet by mouth 3 (Three) Times a Day As Needed for Dizziness.  Dispense: 21 tablet; Refill: 0       Answers for HPI/ROS submitted by the patient on 10/5/2021  What is the primary reason for your visit?: Other  Please describe your symptoms.: Feeling weak, have episodes of breaking out in a sweat and getting dizzy and to get medications refills  Have you had these symptoms before?: No  How long have you been having these symptoms?: 5-7 days    I spent 15 minutes in patient care: Reviewing records prior to the visit, examining the patient, entering orders and documentation    Please note that portions of this document were completed with a voice recognition program. Efforts were made to edit the dictations, but occasionally words are mis-transcribed

## 2021-10-12 ENCOUNTER — LAB (OUTPATIENT)
Dept: LAB | Facility: HOSPITAL | Age: 51
End: 2021-10-12

## 2021-10-12 DIAGNOSIS — R53.83 FATIGUE, UNSPECIFIED TYPE: ICD-10-CM

## 2021-10-12 LAB
DEPRECATED RDW RBC AUTO: 41.1 FL (ref 37–54)
ERYTHROCYTE [DISTWIDTH] IN BLOOD BY AUTOMATED COUNT: 13 % (ref 12.3–15.4)
HCT VFR BLD AUTO: 41.2 % (ref 37.5–51)
HGB BLD-MCNC: 13.5 G/DL (ref 13–17.7)
MCH RBC QN AUTO: 28.5 PG (ref 26.6–33)
MCHC RBC AUTO-ENTMCNC: 32.8 G/DL (ref 31.5–35.7)
MCV RBC AUTO: 86.9 FL (ref 79–97)
PLATELET # BLD AUTO: 158 10*3/MM3 (ref 140–450)
PMV BLD AUTO: 10.6 FL (ref 6–12)
RBC # BLD AUTO: 4.74 10*6/MM3 (ref 4.14–5.8)
WBC # BLD AUTO: 6.55 10*3/MM3 (ref 3.4–10.8)

## 2021-10-12 PROCEDURE — 84443 ASSAY THYROID STIM HORMONE: CPT

## 2021-10-12 PROCEDURE — 80053 COMPREHEN METABOLIC PANEL: CPT

## 2021-10-12 PROCEDURE — 36415 COLL VENOUS BLD VENIPUNCTURE: CPT

## 2021-10-12 PROCEDURE — 85027 COMPLETE CBC AUTOMATED: CPT

## 2021-10-13 LAB
ALBUMIN SERPL-MCNC: 4.6 G/DL (ref 3.5–5.2)
ALBUMIN/GLOB SERPL: 2.3 G/DL
ALP SERPL-CCNC: 84 U/L (ref 39–117)
ALT SERPL W P-5'-P-CCNC: 14 U/L (ref 1–41)
ANION GAP SERPL CALCULATED.3IONS-SCNC: 11.8 MMOL/L (ref 5–15)
AST SERPL-CCNC: 18 U/L (ref 1–40)
BILIRUB SERPL-MCNC: 0.3 MG/DL (ref 0–1.2)
BUN SERPL-MCNC: 7 MG/DL (ref 6–20)
BUN/CREAT SERPL: 8.4 (ref 7–25)
CALCIUM SPEC-SCNC: 9.3 MG/DL (ref 8.6–10.5)
CHLORIDE SERPL-SCNC: 102 MMOL/L (ref 98–107)
CO2 SERPL-SCNC: 26.2 MMOL/L (ref 22–29)
CREAT SERPL-MCNC: 0.83 MG/DL (ref 0.76–1.27)
GFR SERPL CREATININE-BSD FRML MDRD: 98 ML/MIN/1.73
GLOBULIN UR ELPH-MCNC: 2 GM/DL
GLUCOSE SERPL-MCNC: 87 MG/DL (ref 65–99)
POTASSIUM SERPL-SCNC: 4.1 MMOL/L (ref 3.5–5.2)
PROT SERPL-MCNC: 6.6 G/DL (ref 6–8.5)
SODIUM SERPL-SCNC: 140 MMOL/L (ref 136–145)
TSH SERPL DL<=0.05 MIU/L-ACNC: 1.78 UIU/ML (ref 0.27–4.2)

## 2021-10-20 ENCOUNTER — HOSPITAL ENCOUNTER (OUTPATIENT)
Dept: CARDIOLOGY | Facility: HOSPITAL | Age: 51
Discharge: HOME | End: 2021-10-20
Admitting: PHYSICIAN ASSISTANT

## 2021-10-20 VITALS — HEIGHT: 71 IN | BODY MASS INDEX: 27.41 KG/M2 | WEIGHT: 195.77 LBS

## 2021-10-20 DIAGNOSIS — R42 DIZZINESS: ICD-10-CM

## 2021-10-20 LAB
BH CV ECHO MEAS - BSA(HAYCOCK): 2.1 M^2
BH CV ECHO MEAS - BSA: 2.1 M^2
BH CV ECHO MEAS - BZI_BMI: 27.2 KILOGRAMS/M^2
BH CV ECHO MEAS - BZI_METRIC_HEIGHT: 180.3 CM
BH CV ECHO MEAS - BZI_METRIC_WEIGHT: 88.5 KG
BH CV XLRA MEAS LEFT DIST CCA EDV: 29.7 CM/SEC
BH CV XLRA MEAS LEFT DIST CCA PSV: 91.2 CM/SEC
BH CV XLRA MEAS LEFT DIST ICA EDV: 36.3 CM/SEC
BH CV XLRA MEAS LEFT DIST ICA PSV: 90.8 CM/SEC
BH CV XLRA MEAS LEFT ICA/CCA RATIO: 0.7
BH CV XLRA MEAS LEFT MID CCA EDV: 26.5 CM/SEC
BH CV XLRA MEAS LEFT MID CCA PSV: 123 CM/SEC
BH CV XLRA MEAS LEFT MID ICA EDV: 32.3 CM/SEC
BH CV XLRA MEAS LEFT MID ICA PSV: 80.8 CM/SEC
BH CV XLRA MEAS LEFT PROX CCA EDV: 30.9 CM/SEC
BH CV XLRA MEAS LEFT PROX CCA PSV: 114.1 CM/SEC
BH CV XLRA MEAS LEFT PROX ECA EDV: 25.9 CM/SEC
BH CV XLRA MEAS LEFT PROX ECA PSV: 104.8 CM/SEC
BH CV XLRA MEAS LEFT PROX ICA EDV: 36.7 CM/SEC
BH CV XLRA MEAS LEFT PROX ICA PSV: 85.6 CM/SEC
BH CV XLRA MEAS LEFT PROX SCLA PSV: 124.9 CM/SEC
BH CV XLRA MEAS LEFT VERTEBRAL A EDV: 18.2 CM/SEC
BH CV XLRA MEAS LEFT VERTEBRAL A PSV: 60.3 CM/SEC
BH CV XLRA MEAS RIGHT DIST CCA EDV: 31.4 CM/SEC
BH CV XLRA MEAS RIGHT DIST CCA PSV: 100.9 CM/SEC
BH CV XLRA MEAS RIGHT DIST ICA EDV: 38.4 CM/SEC
BH CV XLRA MEAS RIGHT DIST ICA PSV: 83.8 CM/SEC
BH CV XLRA MEAS RIGHT ICA/CCA RATIO: 0.65
BH CV XLRA MEAS RIGHT MID CCA EDV: 30.6 CM/SEC
BH CV XLRA MEAS RIGHT MID CCA PSV: 136.7 CM/SEC
BH CV XLRA MEAS RIGHT MID ICA EDV: 42.3 CM/SEC
BH CV XLRA MEAS RIGHT MID ICA PSV: 88.6 CM/SEC
BH CV XLRA MEAS RIGHT PROX CCA EDV: 32.2 CM/SEC
BH CV XLRA MEAS RIGHT PROX CCA PSV: 143 CM/SEC
BH CV XLRA MEAS RIGHT PROX ECA EDV: 27.7 CM/SEC
BH CV XLRA MEAS RIGHT PROX ECA PSV: 122.6 CM/SEC
BH CV XLRA MEAS RIGHT PROX ICA EDV: 30.6 CM/SEC
BH CV XLRA MEAS RIGHT PROX ICA PSV: 85.6 CM/SEC
BH CV XLRA MEAS RIGHT PROX SCLA PSV: 148.4 CM/SEC
BH CV XLRA MEAS RIGHT VERTEBRAL A EDV: 9.63 CM/SEC
BH CV XLRA MEAS RIGHT VERTEBRAL A PSV: 36.5 CM/SEC
LEFT ARM BP: NORMAL MMHG
MAXIMAL PREDICTED HEART RATE: 170 BPM
RIGHT ARM BP: NORMAL MMHG
STRESS TARGET HR: 145 BPM

## 2021-10-20 PROCEDURE — 93880 EXTRACRANIAL BILAT STUDY: CPT | Performed by: INTERNAL MEDICINE

## 2021-10-20 PROCEDURE — 93880 EXTRACRANIAL BILAT STUDY: CPT

## 2021-11-08 RX ORDER — ATORVASTATIN CALCIUM 80 MG/1
80 TABLET, FILM COATED ORAL DAILY
Qty: 30 TABLET | Refills: 1 | Status: SHIPPED | OUTPATIENT
Start: 2021-11-08 | End: 2022-01-05 | Stop reason: SDUPTHER

## 2022-01-07 RX ORDER — ATORVASTATIN CALCIUM 80 MG/1
80 TABLET, FILM COATED ORAL DAILY
Qty: 30 TABLET | Refills: 1 | Status: SHIPPED | OUTPATIENT
Start: 2022-01-07 | End: 2022-03-08 | Stop reason: SDUPTHER

## 2022-03-10 RX ORDER — ATORVASTATIN CALCIUM 80 MG/1
80 TABLET, FILM COATED ORAL DAILY
Qty: 30 TABLET | Refills: 1 | Status: SHIPPED | OUTPATIENT
Start: 2022-03-10 | End: 2022-05-08 | Stop reason: SDUPTHER

## 2022-03-15 ENCOUNTER — OFFICE VISIT (OUTPATIENT)
Dept: FAMILY MEDICINE CLINIC | Facility: CLINIC | Age: 52
End: 2022-03-15

## 2022-03-15 VITALS
OXYGEN SATURATION: 96 % | TEMPERATURE: 97.1 F | WEIGHT: 196 LBS | BODY MASS INDEX: 27.44 KG/M2 | HEIGHT: 71 IN | HEART RATE: 72 BPM | DIASTOLIC BLOOD PRESSURE: 86 MMHG | SYSTOLIC BLOOD PRESSURE: 136 MMHG | RESPIRATION RATE: 16 BRPM

## 2022-03-15 DIAGNOSIS — R07.9 CHEST PAIN, UNSPECIFIED TYPE: ICD-10-CM

## 2022-03-15 DIAGNOSIS — Z12.11 SCREEN FOR COLON CANCER: ICD-10-CM

## 2022-03-15 DIAGNOSIS — Z00.00 ROUTINE GENERAL MEDICAL EXAMINATION AT A HEALTH CARE FACILITY: Primary | ICD-10-CM

## 2022-03-15 DIAGNOSIS — Z12.5 SPECIAL SCREENING FOR MALIGNANT NEOPLASM OF PROSTATE: ICD-10-CM

## 2022-03-15 PROCEDURE — 99396 PREV VISIT EST AGE 40-64: CPT | Performed by: PHYSICIAN ASSISTANT

## 2022-03-15 NOTE — PROGRESS NOTES
Subjective   Galo Jama is a 51 y.o. male  Annual Exam (Due for colonoscopy.)      History of Present Illness     Galo Jama is a 51-year-old male who presents today for a preventive medical examination. He was last seen in 10/2021, for a checkup. He reports he is still having issues with dizziness. He continues to break out in sweats and is still experiencing chest pains. According to the patient, he experienced a recent episode of chest pains yesterday, 03/14/2022.    He reports he has been dealing with new dentures since the middle of last year, 2021, and it is affecting his appetite. The patient is currently waiting for post implants, so his dentures will fit good enough that he does not have to use a lot of glue. He reports he has lost 12 to 15 pounds. According to the patient, he weighed 186 pounds at home 1.5 weeks ago. Today, he currently weighs 195 pounds. He reports he eats dinner at night, but does not eat lunch during the day. Recently, the patient has started to eat a light lunch, and notes when he eats during the day with his teeth, they loosen up. By the time he gets home, his gums are sore. He reports that he just came from a consult with an oral surgeon about putting the post in. When he found out what it is going to cost him, he states he is still weighing the options. He reports that he is going to go back to his dentist.    The following portions of the patient's history were reviewed and updated as appropriate: allergies, current medications, past social history and problem list    Review of Systems   Constitutional: Negative.    HENT: Negative.    Eyes: Negative.    Respiratory: Negative.    Cardiovascular: Positive for chest pain.   Gastrointestinal: Negative.    Endocrine: Negative.    Genitourinary: Negative.    Musculoskeletal: Negative.    Skin: Negative.    Allergic/Immunologic: Negative.    Neurological: Negative.    Hematological: Negative.    Psychiatric/Behavioral:  Negative.    All other systems reviewed and are negative.      Objective     Vitals:    03/15/22 1550   BP: 136/86   Pulse: 72   Resp: 16   Temp: 97.1 °F (36.2 °C)   SpO2: 96%       Physical Exam  Vitals and nursing note reviewed.   Constitutional:       General: He is not in acute distress.     Appearance: Normal appearance. He is well-developed. He is not ill-appearing, toxic-appearing or diaphoretic.   HENT:      Head: Normocephalic and atraumatic.      Right Ear: External ear normal.      Left Ear: External ear normal.   Eyes:      Conjunctiva/sclera: Conjunctivae normal.      Pupils: Pupils are equal, round, and reactive to light.   Neck:      Thyroid: No thyromegaly.      Vascular: No carotid bruit.   Cardiovascular:      Rate and Rhythm: Normal rate and regular rhythm.      Pulses: Normal pulses.      Heart sounds: Normal heart sounds. No murmur heard.  Pulmonary:      Effort: Pulmonary effort is normal. No respiratory distress.      Breath sounds: Normal breath sounds.   Abdominal:      General: Bowel sounds are normal.      Palpations: Abdomen is soft. There is no mass.      Tenderness: There is no abdominal tenderness.   Musculoskeletal:         General: No swelling. Normal range of motion.      Cervical back: Normal range of motion and neck supple.   Lymphadenopathy:      Cervical: No cervical adenopathy.   Skin:     General: Skin is warm and dry.      Findings: No lesion or rash.   Neurological:      Mental Status: He is alert and oriented to person, place, and time.      Cranial Nerves: No cranial nerve deficit.      Sensory: No sensory deficit.      Motor: No weakness.      Coordination: Coordination normal.      Gait: Gait normal.      Deep Tendon Reflexes: Reflexes are normal and symmetric.   Psychiatric:         Mood and Affect: Mood normal.         Behavior: Behavior normal.         Thought Content: Thought content normal.         Judgment: Judgment normal.         Assessment/Plan     Diagnoses and  all orders for this visit:    1. Routine general medical examination at a health care facility (Primary)  -     Comprehensive Metabolic Panel; Future  -     Lipid Panel; Future  -     TSH; Future  -     CBC (No Diff); Future  -     Testosterone, Free, Total; Future    2. Special screening for malignant neoplasm of prostate  -     PSA Screen; Future    3. Screen for colon cancer  -     Cancel: Amb referral for Screening Colonoscopy  -     Amb referral for Screening Colonoscopy    4. Chest pain, unspecified type  -     Treadmill Stress Test; Future       Answers for HPI/ROS submitted by the patient on 3/9/2022  What is the primary reason for your visit?: Other  Please describe your symptoms.: Weigh loss, fatigue need to follow up on test that were not scheduled after my last visit and also need to get new prescriptions for a few of my medications.  Have you had these symptoms before?: No  How long have you been having these symptoms?: Greater than 2 weeks    Preventive medicine discussed, diet, exercise, healthy living discussed at length.  Discussed nutrition, physical activity, healthy weight, injury prevention, misuse of tobacco, alcohol and drugs, dental health, mental health, immunizations, screening    Part of this note may be an electronic transcription/translation of spoken language to printed text using the Dragon Dictation System.        Transcribed from ambient dictation for RUBY Hill by Michelle Veloz.  03/15/22   18:50 EDT    Patient verbalized consent to the visit recording.  I have personally performed the services described in this document as transcribed by the above individual, and it is both accurate and complete.  RUBY Hill  3/16/2022  15:53 EDT

## 2022-03-16 ENCOUNTER — LAB (OUTPATIENT)
Dept: LAB | Facility: HOSPITAL | Age: 52
End: 2022-03-16

## 2022-03-16 DIAGNOSIS — Z12.5 SPECIAL SCREENING FOR MALIGNANT NEOPLASM OF PROSTATE: ICD-10-CM

## 2022-03-16 DIAGNOSIS — Z00.00 ROUTINE GENERAL MEDICAL EXAMINATION AT A HEALTH CARE FACILITY: ICD-10-CM

## 2022-03-16 LAB
ALBUMIN SERPL-MCNC: 4.6 G/DL (ref 3.5–5.2)
ALBUMIN/GLOB SERPL: 2.2 G/DL
ALP SERPL-CCNC: 77 U/L (ref 39–117)
ALT SERPL W P-5'-P-CCNC: 17 U/L (ref 1–41)
ANION GAP SERPL CALCULATED.3IONS-SCNC: 7 MMOL/L (ref 5–15)
AST SERPL-CCNC: 19 U/L (ref 1–40)
BILIRUB SERPL-MCNC: 0.2 MG/DL (ref 0–1.2)
BUN SERPL-MCNC: 12 MG/DL (ref 6–20)
BUN/CREAT SERPL: 12.6 (ref 7–25)
CALCIUM SPEC-SCNC: 9.2 MG/DL (ref 8.6–10.5)
CHLORIDE SERPL-SCNC: 103 MMOL/L (ref 98–107)
CHOLEST SERPL-MCNC: 148 MG/DL (ref 0–200)
CO2 SERPL-SCNC: 30 MMOL/L (ref 22–29)
CREAT SERPL-MCNC: 0.95 MG/DL (ref 0.76–1.27)
DEPRECATED RDW RBC AUTO: 39.5 FL (ref 37–54)
EGFRCR SERPLBLD CKD-EPI 2021: 97.5 ML/MIN/1.73
ERYTHROCYTE [DISTWIDTH] IN BLOOD BY AUTOMATED COUNT: 12.4 % (ref 12.3–15.4)
GLOBULIN UR ELPH-MCNC: 2.1 GM/DL
GLUCOSE SERPL-MCNC: 111 MG/DL (ref 65–99)
HCT VFR BLD AUTO: 45.4 % (ref 37.5–51)
HDLC SERPL-MCNC: 43 MG/DL (ref 40–60)
HGB BLD-MCNC: 14.7 G/DL (ref 13–17.7)
LDLC SERPL CALC-MCNC: 86 MG/DL (ref 0–100)
LDLC/HDLC SERPL: 1.96 {RATIO}
MCH RBC QN AUTO: 28.5 PG (ref 26.6–33)
MCHC RBC AUTO-ENTMCNC: 32.4 G/DL (ref 31.5–35.7)
MCV RBC AUTO: 88 FL (ref 79–97)
PLATELET # BLD AUTO: 155 10*3/MM3 (ref 140–450)
PMV BLD AUTO: 10.6 FL (ref 6–12)
POTASSIUM SERPL-SCNC: 4.5 MMOL/L (ref 3.5–5.2)
PROT SERPL-MCNC: 6.7 G/DL (ref 6–8.5)
PSA SERPL-MCNC: 0.34 NG/ML (ref 0–4)
RBC # BLD AUTO: 5.16 10*6/MM3 (ref 4.14–5.8)
SODIUM SERPL-SCNC: 140 MMOL/L (ref 136–145)
TRIGL SERPL-MCNC: 103 MG/DL (ref 0–150)
TSH SERPL DL<=0.05 MIU/L-ACNC: 1.74 UIU/ML (ref 0.27–4.2)
VLDLC SERPL-MCNC: 19 MG/DL (ref 5–40)
WBC NRBC COR # BLD: 6.82 10*3/MM3 (ref 3.4–10.8)

## 2022-03-16 PROCEDURE — 80050 GENERAL HEALTH PANEL: CPT

## 2022-03-16 PROCEDURE — G0103 PSA SCREENING: HCPCS

## 2022-03-16 PROCEDURE — 36415 COLL VENOUS BLD VENIPUNCTURE: CPT

## 2022-03-16 PROCEDURE — 84402 ASSAY OF FREE TESTOSTERONE: CPT

## 2022-03-16 PROCEDURE — 84403 ASSAY OF TOTAL TESTOSTERONE: CPT

## 2022-03-16 PROCEDURE — 80061 LIPID PANEL: CPT

## 2022-03-19 LAB
TESTOST FREE SERPL-MCNC: 4.8 PG/ML (ref 7.2–24)
TESTOST SERPL-MCNC: 622 NG/DL (ref 264–916)

## 2022-05-09 RX ORDER — ATORVASTATIN CALCIUM 80 MG/1
80 TABLET, FILM COATED ORAL DAILY
Qty: 30 TABLET | Refills: 11 | Status: SHIPPED | OUTPATIENT
Start: 2022-05-09

## 2022-08-29 RX ORDER — LISINOPRIL 40 MG/1
40 TABLET ORAL DAILY
Qty: 30 TABLET | Refills: 11 | Status: SHIPPED | OUTPATIENT
Start: 2022-08-29

## 2023-01-05 RX ORDER — TRAZODONE HYDROCHLORIDE 50 MG/1
50 TABLET ORAL
Qty: 30 TABLET | Refills: 3 | Status: SHIPPED | OUTPATIENT
Start: 2023-01-05 | End: 2023-03-29 | Stop reason: SDUPTHER

## 2023-01-05 NOTE — TELEPHONE ENCOUNTER
Caller: Galo Jama    Relationship: Self    Best call back number: [phone]    Requested Prescriptions:   Requested Prescriptions     Pending Prescriptions Disp Refills   • traZODone (DESYREL) 50 MG tablet 30 tablet 5     Sig: Take 1 tablet by mouth every night at bedtime.        Pharmacy where request should be sent: [facility]     Additional details provided by patient: PATIENT CALLED STATING HIS PHARMACY HAS SENT US 3 REQUEST FOR THIS MEDICATION BUT I DID NOT SEE THEM. PATIENT HAS BEEN OUT SINCE 01/01/2023 AND HE NEEDS THIS MEDICATIONS ASAP AS HE HAS NOT BEEN DOING WELL WITHOUT IT. PLEASE ADVISE     Does the patient have less than a 3 day supply:  [x] Yes  [] No    Would you like a call back once the refill request has been completed: [x] Yes [] No    If the office needs to give you a call back, can they leave a voicemail: [x] Yes [] No    Alley Hoffmann Rep   01/05/23 08:11 EST

## 2023-01-05 NOTE — TELEPHONE ENCOUNTER
Don't see any refill requests either-in Epic or faxed.    It has been sent in per Don. He is due for an appointment in March.

## 2023-03-29 RX ORDER — TRAZODONE HYDROCHLORIDE 50 MG/1
50 TABLET ORAL
Qty: 30 TABLET | Refills: 0 | Status: SHIPPED | OUTPATIENT
Start: 2023-03-29

## 2023-04-28 RX ORDER — TRAZODONE HYDROCHLORIDE 50 MG/1
50 TABLET ORAL
Qty: 30 TABLET | Refills: 0 | OUTPATIENT
Start: 2023-04-28

## 2023-05-03 ENCOUNTER — OFFICE VISIT (OUTPATIENT)
Dept: FAMILY MEDICINE CLINIC | Facility: CLINIC | Age: 53
End: 2023-05-03
Payer: COMMERCIAL

## 2023-05-03 ENCOUNTER — LAB (OUTPATIENT)
Dept: LAB | Facility: HOSPITAL | Age: 53
End: 2023-05-03
Payer: COMMERCIAL

## 2023-05-03 VITALS
SYSTOLIC BLOOD PRESSURE: 118 MMHG | RESPIRATION RATE: 16 BRPM | WEIGHT: 202.8 LBS | DIASTOLIC BLOOD PRESSURE: 76 MMHG | HEIGHT: 71 IN | BODY MASS INDEX: 28.39 KG/M2 | HEART RATE: 77 BPM | OXYGEN SATURATION: 96 %

## 2023-05-03 DIAGNOSIS — Z12.5 SPECIAL SCREENING FOR MALIGNANT NEOPLASM OF PROSTATE: ICD-10-CM

## 2023-05-03 DIAGNOSIS — Z00.00 ROUTINE GENERAL MEDICAL EXAMINATION AT A HEALTH CARE FACILITY: Primary | ICD-10-CM

## 2023-05-03 DIAGNOSIS — Z00.00 ROUTINE GENERAL MEDICAL EXAMINATION AT A HEALTH CARE FACILITY: ICD-10-CM

## 2023-05-03 LAB
BASOPHILS # BLD AUTO: 0.03 10*3/MM3 (ref 0–0.2)
BASOPHILS NFR BLD AUTO: 0.4 % (ref 0–1.5)
DEPRECATED RDW RBC AUTO: 39.9 FL (ref 37–54)
EOSINOPHIL # BLD AUTO: 0.07 10*3/MM3 (ref 0–0.4)
EOSINOPHIL NFR BLD AUTO: 1 % (ref 0.3–6.2)
ERYTHROCYTE [DISTWIDTH] IN BLOOD BY AUTOMATED COUNT: 12.9 % (ref 12.3–15.4)
HBA1C MFR BLD: 6 % (ref 4.8–5.6)
HCT VFR BLD AUTO: 41.7 % (ref 37.5–51)
HGB BLD-MCNC: 14.4 G/DL (ref 13–17.7)
IMM GRANULOCYTES # BLD AUTO: 0.02 10*3/MM3 (ref 0–0.05)
IMM GRANULOCYTES NFR BLD AUTO: 0.3 % (ref 0–0.5)
LYMPHOCYTES # BLD AUTO: 2.21 10*3/MM3 (ref 0.7–3.1)
LYMPHOCYTES NFR BLD AUTO: 30.8 % (ref 19.6–45.3)
MCH RBC QN AUTO: 29.7 PG (ref 26.6–33)
MCHC RBC AUTO-ENTMCNC: 34.5 G/DL (ref 31.5–35.7)
MCV RBC AUTO: 86 FL (ref 79–97)
MONOCYTES # BLD AUTO: 0.65 10*3/MM3 (ref 0.1–0.9)
MONOCYTES NFR BLD AUTO: 9.1 % (ref 5–12)
NEUTROPHILS NFR BLD AUTO: 4.19 10*3/MM3 (ref 1.7–7)
NEUTROPHILS NFR BLD AUTO: 58.4 % (ref 42.7–76)
NRBC BLD AUTO-RTO: 0 /100 WBC (ref 0–0.2)
PLATELET # BLD AUTO: 165 10*3/MM3 (ref 140–450)
PMV BLD AUTO: 10.4 FL (ref 6–12)
RBC # BLD AUTO: 4.85 10*6/MM3 (ref 4.14–5.8)
WBC NRBC COR # BLD: 7.17 10*3/MM3 (ref 3.4–10.8)

## 2023-05-03 PROCEDURE — 80061 LIPID PANEL: CPT

## 2023-05-03 PROCEDURE — 80050 GENERAL HEALTH PANEL: CPT

## 2023-05-03 PROCEDURE — G0103 PSA SCREENING: HCPCS

## 2023-05-03 PROCEDURE — 99396 PREV VISIT EST AGE 40-64: CPT | Performed by: PHYSICIAN ASSISTANT

## 2023-05-03 PROCEDURE — 36415 COLL VENOUS BLD VENIPUNCTURE: CPT

## 2023-05-03 PROCEDURE — 86803 HEPATITIS C AB TEST: CPT

## 2023-05-03 PROCEDURE — 83036 HEMOGLOBIN GLYCOSYLATED A1C: CPT

## 2023-05-03 RX ORDER — LISINOPRIL 40 MG/1
40 TABLET ORAL DAILY
Qty: 90 TABLET | Refills: 3 | Status: SHIPPED | OUTPATIENT
Start: 2023-05-03

## 2023-05-03 RX ORDER — TRAZODONE HYDROCHLORIDE 100 MG/1
100 TABLET ORAL NIGHTLY
Qty: 90 TABLET | Refills: 3 | Status: SHIPPED | OUTPATIENT
Start: 2023-05-03

## 2023-05-03 RX ORDER — ATORVASTATIN CALCIUM 80 MG/1
80 TABLET, FILM COATED ORAL DAILY
Qty: 90 TABLET | Refills: 3 | Status: SHIPPED | OUTPATIENT
Start: 2023-05-03

## 2023-05-03 RX ORDER — MULTIPLE VITAMINS W/ MINERALS TAB 9MG-400MCG
1 TAB ORAL DAILY
COMMUNITY

## 2023-05-03 RX ORDER — TRAZODONE HYDROCHLORIDE 50 MG/1
50 TABLET ORAL
Qty: 90 TABLET | Refills: 3 | Status: SHIPPED | OUTPATIENT
Start: 2023-05-03 | End: 2023-05-03 | Stop reason: SDUPTHER

## 2023-05-04 LAB
ALBUMIN SERPL-MCNC: 4.9 G/DL (ref 3.5–5.2)
ALBUMIN/GLOB SERPL: 2.1 G/DL
ALP SERPL-CCNC: 81 U/L (ref 39–117)
ALT SERPL W P-5'-P-CCNC: 21 U/L (ref 1–41)
ANION GAP SERPL CALCULATED.3IONS-SCNC: 9 MMOL/L (ref 5–15)
AST SERPL-CCNC: 30 U/L (ref 1–40)
BILIRUB SERPL-MCNC: 0.7 MG/DL (ref 0–1.2)
BUN SERPL-MCNC: 12 MG/DL (ref 6–20)
BUN/CREAT SERPL: 12.6 (ref 7–25)
CALCIUM SPEC-SCNC: 9.5 MG/DL (ref 8.6–10.5)
CHLORIDE SERPL-SCNC: 104 MMOL/L (ref 98–107)
CHOLEST SERPL-MCNC: 123 MG/DL (ref 0–200)
CO2 SERPL-SCNC: 29 MMOL/L (ref 22–29)
CREAT SERPL-MCNC: 0.95 MG/DL (ref 0.76–1.27)
EGFRCR SERPLBLD CKD-EPI 2021: 96.9 ML/MIN/1.73
GLOBULIN UR ELPH-MCNC: 2.3 GM/DL
GLUCOSE SERPL-MCNC: 79 MG/DL (ref 65–99)
HCV AB SER DONR QL: NORMAL
HDLC SERPL-MCNC: 47 MG/DL (ref 40–60)
LDLC SERPL CALC-MCNC: 61 MG/DL (ref 0–100)
LDLC/HDLC SERPL: 1.3 {RATIO}
POTASSIUM SERPL-SCNC: 4.3 MMOL/L (ref 3.5–5.2)
PROT SERPL-MCNC: 7.2 G/DL (ref 6–8.5)
PSA SERPL-MCNC: 0.42 NG/ML (ref 0–4)
SODIUM SERPL-SCNC: 142 MMOL/L (ref 136–145)
TRIGL SERPL-MCNC: 74 MG/DL (ref 0–150)
TSH SERPL DL<=0.05 MIU/L-ACNC: 2.26 UIU/ML (ref 0.27–4.2)
VLDLC SERPL-MCNC: 15 MG/DL (ref 5–40)

## 2023-09-13 ENCOUNTER — OFFICE VISIT (OUTPATIENT)
Dept: FAMILY MEDICINE CLINIC | Facility: CLINIC | Age: 53
End: 2023-09-13
Payer: COMMERCIAL

## 2023-09-13 VITALS
SYSTOLIC BLOOD PRESSURE: 124 MMHG | OXYGEN SATURATION: 97 % | RESPIRATION RATE: 14 BRPM | WEIGHT: 217.6 LBS | HEIGHT: 72 IN | BODY MASS INDEX: 29.47 KG/M2 | DIASTOLIC BLOOD PRESSURE: 78 MMHG | HEART RATE: 71 BPM

## 2023-09-13 DIAGNOSIS — R10.13 MIDEPIGASTRIC PAIN: ICD-10-CM

## 2023-09-13 DIAGNOSIS — K21.00 GASTROESOPHAGEAL REFLUX DISEASE WITH ESOPHAGITIS WITHOUT HEMORRHAGE: Primary | ICD-10-CM

## 2023-09-13 PROCEDURE — 99213 OFFICE O/P EST LOW 20 MIN: CPT | Performed by: PHYSICIAN ASSISTANT

## 2023-09-13 RX ORDER — ESOMEPRAZOLE MAGNESIUM 40 MG/1
40 CAPSULE, DELAYED RELEASE ORAL
COMMUNITY

## 2023-09-13 NOTE — PROGRESS NOTES
Subjective   Galo Jama is a 53 y.o. male  GERD (Has been taking Nexium which helps some. Also c/o intermittent pain in RUQ, worse after eating.)      History of Present Illness  Patient comes in complaining of uncontrolled GERD has been taking Nexium for about 2 weeks with some improvement still having dysphagia midepigastric pain has a history of a stricture has seen Dr. Jayden Roberts in the past patient states symptoms not controlled with OTC meds along with Nexium    The following portions of the patient's history were reviewed and updated as appropriate: allergies, current medications, past social history and problem list    Review of Systems   Constitutional:  Negative for fatigue and unexpected weight change.   Respiratory:  Negative for cough, chest tightness and shortness of breath.    Cardiovascular:  Negative for chest pain, palpitations and leg swelling.   Gastrointestinal:  Negative for nausea.   Skin:  Negative for color change and rash.   Neurological:  Negative for dizziness, syncope, weakness and headaches.     Objective     Vitals:    09/13/23 1144   BP: 124/78   Pulse: 71   Resp: 14   SpO2: 97%       Physical Exam  Vitals and nursing note reviewed.   Constitutional:       General: He is not in acute distress.     Appearance: Normal appearance. He is well-developed. He is not ill-appearing, toxic-appearing or diaphoretic.   Neck:      Vascular: No carotid bruit or JVD.   Cardiovascular:      Rate and Rhythm: Normal rate and regular rhythm.      Pulses: Normal pulses.      Heart sounds: Normal heart sounds. No murmur heard.  Pulmonary:      Effort: Pulmonary effort is normal. No respiratory distress.      Breath sounds: Normal breath sounds.   Abdominal:      Palpations: Abdomen is soft.      Tenderness: There is no abdominal tenderness.   Skin:     General: Skin is warm and dry.   Neurological:      Mental Status: He is alert.       Assessment & Plan     Diagnoses and all orders for this  visit:    1. Gastroesophageal reflux disease with esophagitis without hemorrhage (Primary)  -     Ambulatory referral for Screening EGD    2. Midepigastric pain  -     Ambulatory referral for Screening EGD     I spent 15 minutes in patient care: Reviewing records prior to the visit, examining the patient, entering orders and documentation    Part of this note may be an electronic transcription/translation of spoken language to printed text using the Dragon Dictation System.

## 2023-09-22 ENCOUNTER — TELEPHONE (OUTPATIENT)
Dept: FAMILY MEDICINE CLINIC | Facility: CLINIC | Age: 53
End: 2023-09-22

## 2023-09-22 NOTE — TELEPHONE ENCOUNTER
Caller: Galo Jama    Relationship: Self    Best call back number: [phone]    What is the medical concern/diagnosis: ENDOSCOPY SCOPE OF THROAT     What specialty or service is being requested: GASTROINTESTINAL DOCTOR     What is the provider, practice or medical service name: [facility]       Any additional details: THE PATIENT STATES THAT HE WAS REFERRED TO [facility] BUT THEY ARE NOW OUT OF NETWORK HE WOULD LIKE TO BE REFERRED TO [facility] INSTEAD

## 2024-04-19 RX ORDER — TRAZODONE HYDROCHLORIDE 100 MG/1
100 TABLET ORAL NIGHTLY
Qty: 90 TABLET | Refills: 3 | Status: SHIPPED | OUTPATIENT
Start: 2024-04-19

## 2024-04-26 RX ORDER — ATORVASTATIN CALCIUM 80 MG/1
80 TABLET, FILM COATED ORAL DAILY
Qty: 90 TABLET | Refills: 3 | Status: CANCELLED | OUTPATIENT
Start: 2024-04-26

## 2024-04-29 RX ORDER — ATORVASTATIN CALCIUM 80 MG/1
80 TABLET, FILM COATED ORAL DAILY
Qty: 90 TABLET | Refills: 3 | Status: SHIPPED | OUTPATIENT
Start: 2024-04-29

## 2024-05-16 ENCOUNTER — OFFICE VISIT (OUTPATIENT)
Dept: FAMILY MEDICINE CLINIC | Facility: CLINIC | Age: 54
End: 2024-05-16
Payer: COMMERCIAL

## 2024-05-16 VITALS
RESPIRATION RATE: 16 BRPM | HEIGHT: 72 IN | HEART RATE: 65 BPM | BODY MASS INDEX: 29.45 KG/M2 | OXYGEN SATURATION: 98 % | TEMPERATURE: 97.8 F | DIASTOLIC BLOOD PRESSURE: 68 MMHG | SYSTOLIC BLOOD PRESSURE: 120 MMHG | WEIGHT: 217.4 LBS

## 2024-05-16 DIAGNOSIS — R60.0 EDEMA OF BOTH LOWER LEGS: ICD-10-CM

## 2024-05-16 DIAGNOSIS — Z00.00 ROUTINE GENERAL MEDICAL EXAMINATION AT A HEALTH CARE FACILITY: Primary | ICD-10-CM

## 2024-05-16 DIAGNOSIS — Z12.5 SPECIAL SCREENING FOR MALIGNANT NEOPLASM OF PROSTATE: ICD-10-CM

## 2024-05-16 PROCEDURE — 93000 ELECTROCARDIOGRAM COMPLETE: CPT | Performed by: PHYSICIAN ASSISTANT

## 2024-05-16 PROCEDURE — 99396 PREV VISIT EST AGE 40-64: CPT | Performed by: PHYSICIAN ASSISTANT

## 2024-05-16 RX ORDER — HYDROCHLOROTHIAZIDE 25 MG/1
25 TABLET ORAL DAILY
Qty: 30 TABLET | Refills: 11 | Status: SHIPPED | OUTPATIENT
Start: 2024-05-16

## 2024-05-16 NOTE — PROGRESS NOTES
Subjective   Galo Jama is a 53 y.o. male  Annual Exam (Not fasting. UTD on colonoscopy. ) and Numbness (Bottom of bilateral feet x4-5 months)      History of Present Illness  Patient is a 53-year-old white male who comes in for preventive medical examination complains of swelling in both lower extremities patient has numbness and burning in feet concerned about possible diabetes or peripheral vascular disease ongoing issues with tobacco abuse  The following portions of the patient's history were reviewed and updated as appropriate: allergies, current medications, past social history and problem list    Review of Systems   Constitutional: Negative.    HENT: Negative.     Eyes: Negative.    Respiratory: Negative.     Cardiovascular:  Positive for leg swelling.   Gastrointestinal: Negative.    Endocrine: Negative.    Genitourinary: Negative.    Musculoskeletal:  Positive for arthralgias.        Foot pain, edema   Skin: Negative.    Allergic/Immunologic: Negative.    Neurological: Negative.    Hematological: Negative.    Psychiatric/Behavioral: Negative.     All other systems reviewed and are negative.      Objective     Vitals:    05/16/24 1449   BP: 120/68   Pulse: 65   Resp: 16   Temp: 97.8 °F (36.6 °C)   SpO2: 98%       Physical Exam  Vitals and nursing note reviewed.   Constitutional:       General: He is not in acute distress.     Appearance: Normal appearance. He is well-developed. He is not ill-appearing, toxic-appearing or diaphoretic.   HENT:      Head: Normocephalic and atraumatic.      Right Ear: External ear normal.      Left Ear: External ear normal.   Eyes:      Conjunctiva/sclera: Conjunctivae normal.      Pupils: Pupils are equal, round, and reactive to light.   Neck:      Thyroid: No thyromegaly.      Vascular: No carotid bruit.   Cardiovascular:      Rate and Rhythm: Normal rate and regular rhythm.      Pulses: Normal pulses.      Heart sounds: Normal heart sounds. No murmur  heard.  Pulmonary:      Effort: Pulmonary effort is normal. No respiratory distress.      Breath sounds: Normal breath sounds.   Abdominal:      General: Bowel sounds are normal.      Palpations: Abdomen is soft. There is no mass.      Tenderness: There is no abdominal tenderness.   Musculoskeletal:         General: No swelling. Normal range of motion.      Cervical back: Normal range of motion and neck supple.        Legs:    Lymphadenopathy:      Cervical: No cervical adenopathy.   Skin:     General: Skin is warm and dry.      Findings: No lesion or rash.   Neurological:      Mental Status: He is alert and oriented to person, place, and time.      Cranial Nerves: No cranial nerve deficit.      Sensory: No sensory deficit.      Motor: No weakness.      Coordination: Coordination normal.      Gait: Gait normal.      Deep Tendon Reflexes: Reflexes are normal and symmetric.   Psychiatric:         Mood and Affect: Mood normal.         Behavior: Behavior normal.         Thought Content: Thought content normal.         Judgment: Judgment normal.         ECG 12 Lead    Date/Time: 5/16/2024 3:34 PM  Performed by: Ad Gomez PA    Authorized by: Ad Gomez PA  Comparison: not compared with previous ECG   Conduction: conduction normal  ST Segments: ST segments normal    Clinical impression: normal ECG         Assessment & Plan     Diagnoses and all orders for this visit:    1. Routine general medical examination at a health care facility (Primary)  -     Comprehensive Metabolic Panel; Future  -     Lipid Panel; Future  -     TSH; Future  -     CBC (No Diff); Future  -     hydroCHLOROthiazide 25 MG tablet; Take 1 tablet by mouth Daily.  Dispense: 30 tablet; Refill: 11    2. Special screening for malignant neoplasm of prostate  -     PSA Screen; Future  -     Hemoglobin A1c; Future    3. Edema of both lower legs  -     Duplex Venous Lower Extremity - Bilateral CAR; Future     .Preventive medicine  discussed, diet, exercise, healthy living discussed at length.  Discussed nutrition, physical activity, healthy weight, injury prevention, misuse of tobacco, alcohol and drugs, dental health, mental health, immunizations, screening    Part of this note may be an electronic transcription/translation of spoken language to printed text using the Dragon Dictation System.

## 2024-05-17 ENCOUNTER — LAB (OUTPATIENT)
Dept: LAB | Facility: HOSPITAL | Age: 54
End: 2024-05-17
Payer: COMMERCIAL

## 2024-05-17 DIAGNOSIS — Z00.00 ROUTINE GENERAL MEDICAL EXAMINATION AT A HEALTH CARE FACILITY: ICD-10-CM

## 2024-05-17 DIAGNOSIS — Z12.5 SPECIAL SCREENING FOR MALIGNANT NEOPLASM OF PROSTATE: ICD-10-CM

## 2024-05-17 LAB
ALBUMIN SERPL-MCNC: 4.5 G/DL (ref 3.5–5.2)
ALBUMIN/GLOB SERPL: 2.1 G/DL
ALP SERPL-CCNC: 99 U/L (ref 39–117)
ALT SERPL W P-5'-P-CCNC: 15 U/L (ref 1–41)
ANION GAP SERPL CALCULATED.3IONS-SCNC: 8.9 MMOL/L (ref 5–15)
AST SERPL-CCNC: 19 U/L (ref 1–40)
BILIRUB SERPL-MCNC: 0.5 MG/DL (ref 0–1.2)
BUN SERPL-MCNC: 9 MG/DL (ref 6–20)
BUN/CREAT SERPL: 9.5 (ref 7–25)
CALCIUM SPEC-SCNC: 9.4 MG/DL (ref 8.6–10.5)
CHLORIDE SERPL-SCNC: 104 MMOL/L (ref 98–107)
CHOLEST SERPL-MCNC: 110 MG/DL (ref 0–200)
CO2 SERPL-SCNC: 26.1 MMOL/L (ref 22–29)
CREAT SERPL-MCNC: 0.95 MG/DL (ref 0.76–1.27)
DEPRECATED RDW RBC AUTO: 39.4 FL (ref 37–54)
EGFRCR SERPLBLD CKD-EPI 2021: 96.3 ML/MIN/1.73
ERYTHROCYTE [DISTWIDTH] IN BLOOD BY AUTOMATED COUNT: 12.7 % (ref 12.3–15.4)
GLOBULIN UR ELPH-MCNC: 2.1 GM/DL
GLUCOSE SERPL-MCNC: 87 MG/DL (ref 65–99)
HBA1C MFR BLD: 6.2 % (ref 4.8–5.6)
HCT VFR BLD AUTO: 41.2 % (ref 37.5–51)
HDLC SERPL-MCNC: 33 MG/DL (ref 40–60)
HGB BLD-MCNC: 13.8 G/DL (ref 13–17.7)
LDLC SERPL CALC-MCNC: 61 MG/DL (ref 0–100)
LDLC/HDLC SERPL: 1.85 {RATIO}
MCH RBC QN AUTO: 28.8 PG (ref 26.6–33)
MCHC RBC AUTO-ENTMCNC: 33.5 G/DL (ref 31.5–35.7)
MCV RBC AUTO: 86 FL (ref 79–97)
PLATELET # BLD AUTO: 163 10*3/MM3 (ref 140–450)
PMV BLD AUTO: 10.7 FL (ref 6–12)
POTASSIUM SERPL-SCNC: 4.6 MMOL/L (ref 3.5–5.2)
PROT SERPL-MCNC: 6.6 G/DL (ref 6–8.5)
PSA SERPL-MCNC: 0.43 NG/ML (ref 0–4)
RBC # BLD AUTO: 4.79 10*6/MM3 (ref 4.14–5.8)
SODIUM SERPL-SCNC: 139 MMOL/L (ref 136–145)
TRIGL SERPL-MCNC: 79 MG/DL (ref 0–150)
TSH SERPL DL<=0.05 MIU/L-ACNC: 1.82 UIU/ML (ref 0.27–4.2)
VLDLC SERPL-MCNC: 16 MG/DL (ref 5–40)
WBC NRBC COR # BLD AUTO: 8.23 10*3/MM3 (ref 3.4–10.8)

## 2024-05-17 PROCEDURE — 36415 COLL VENOUS BLD VENIPUNCTURE: CPT

## 2024-05-17 PROCEDURE — 80050 GENERAL HEALTH PANEL: CPT

## 2024-05-17 PROCEDURE — 80061 LIPID PANEL: CPT

## 2024-05-17 PROCEDURE — G0103 PSA SCREENING: HCPCS

## 2024-05-17 PROCEDURE — 83036 HEMOGLOBIN GLYCOSYLATED A1C: CPT

## 2024-05-17 RX ORDER — LISINOPRIL 40 MG/1
40 TABLET ORAL DAILY
Qty: 90 TABLET | Refills: 3 | Status: SHIPPED | OUTPATIENT
Start: 2024-05-17

## 2024-05-28 ENCOUNTER — HOSPITAL ENCOUNTER (OUTPATIENT)
Facility: HOSPITAL | Age: 54
Discharge: HOME | End: 2024-05-28
Admitting: PHYSICIAN ASSISTANT
Payer: COMMERCIAL

## 2024-05-28 DIAGNOSIS — R60.0 EDEMA OF BOTH LOWER LEGS: ICD-10-CM

## 2024-05-28 LAB
BH CV LOWER VASCULAR LEFT COMMON FEMORAL COMPRESS: NORMAL
BH CV LOWER VASCULAR LEFT COMMON FEMORAL PHASIC: NORMAL
BH CV LOWER VASCULAR LEFT COMMON FEMORAL SPONT: NORMAL
BH CV LOWER VASCULAR LEFT DISTAL FEMORAL COMPRESS: NORMAL
BH CV LOWER VASCULAR LEFT DISTAL FEMORAL PHASIC: NORMAL
BH CV LOWER VASCULAR LEFT DISTAL FEMORAL SPONT: NORMAL
BH CV LOWER VASCULAR LEFT GASTRONEMIUS COMPRESS: NORMAL
BH CV LOWER VASCULAR LEFT GREATER SAPH AK COMPRESS: NORMAL
BH CV LOWER VASCULAR LEFT GREATER SAPH BK COMPRESS: NORMAL
BH CV LOWER VASCULAR LEFT LESSER SAPH COMPRESS: NORMAL
BH CV LOWER VASCULAR LEFT MID FEMORAL COMPRESS: NORMAL
BH CV LOWER VASCULAR LEFT MID FEMORAL PHASIC: NORMAL
BH CV LOWER VASCULAR LEFT MID FEMORAL SPONT: NORMAL
BH CV LOWER VASCULAR LEFT PERONEAL AUGMENT: NORMAL
BH CV LOWER VASCULAR LEFT PERONEAL COMPRESS: NORMAL
BH CV LOWER VASCULAR LEFT POPLITEAL COMPRESS: NORMAL
BH CV LOWER VASCULAR LEFT POPLITEAL PHASIC: NORMAL
BH CV LOWER VASCULAR LEFT POPLITEAL SPONT: NORMAL
BH CV LOWER VASCULAR LEFT POSTERIOR TIBIAL AUGMENT: NORMAL
BH CV LOWER VASCULAR LEFT POSTERIOR TIBIAL COMPRESS: NORMAL
BH CV LOWER VASCULAR LEFT PROFUNDA FEMORAL PHASIC: NORMAL
BH CV LOWER VASCULAR LEFT PROFUNDA FEMORAL SPONT: NORMAL
BH CV LOWER VASCULAR LEFT PROXIMAL FEMORAL COMPRESS: NORMAL
BH CV LOWER VASCULAR LEFT PROXIMAL FEMORAL PHASIC: NORMAL
BH CV LOWER VASCULAR LEFT PROXIMAL FEMORAL SPONT: NORMAL
BH CV LOWER VASCULAR LEFT SAPHENOFEMORAL JUNCTION COMPRESS: NORMAL
BH CV LOWER VASCULAR LEFT SAPHENOFEMORAL JUNCTION PHASIC: NORMAL
BH CV LOWER VASCULAR LEFT SAPHENOFEMORAL JUNCTION SPONT: NORMAL
BH CV LOWER VASCULAR RIGHT COMMON FEMORAL COMPRESS: NORMAL
BH CV LOWER VASCULAR RIGHT COMMON FEMORAL PHASIC: NORMAL
BH CV LOWER VASCULAR RIGHT COMMON FEMORAL SPONT: NORMAL
BH CV LOWER VASCULAR RIGHT DISTAL FEMORAL COMPRESS: NORMAL
BH CV LOWER VASCULAR RIGHT DISTAL FEMORAL PHASIC: NORMAL
BH CV LOWER VASCULAR RIGHT DISTAL FEMORAL SPONT: NORMAL
BH CV LOWER VASCULAR RIGHT GASTRONEMIUS COMPRESS: NORMAL
BH CV LOWER VASCULAR RIGHT GREATER SAPH AK COMPRESS: NORMAL
BH CV LOWER VASCULAR RIGHT GREATER SAPH BK COMPRESS: NORMAL
BH CV LOWER VASCULAR RIGHT LESSER SAPH COMPRESS: NORMAL
BH CV LOWER VASCULAR RIGHT MID FEMORAL COMPRESS: NORMAL
BH CV LOWER VASCULAR RIGHT MID FEMORAL PHASIC: NORMAL
BH CV LOWER VASCULAR RIGHT MID FEMORAL SPONT: NORMAL
BH CV LOWER VASCULAR RIGHT PERONEAL AUGMENT: NORMAL
BH CV LOWER VASCULAR RIGHT PERONEAL COMPRESS: NORMAL
BH CV LOWER VASCULAR RIGHT POPLITEAL COMPRESS: NORMAL
BH CV LOWER VASCULAR RIGHT POPLITEAL PHASIC: NORMAL
BH CV LOWER VASCULAR RIGHT POPLITEAL SPONT: NORMAL
BH CV LOWER VASCULAR RIGHT POSTERIOR TIBIAL AUGMENT: NORMAL
BH CV LOWER VASCULAR RIGHT POSTERIOR TIBIAL COMPRESS: NORMAL
BH CV LOWER VASCULAR RIGHT PROFUNDA FEMORAL PHASIC: NORMAL
BH CV LOWER VASCULAR RIGHT PROFUNDA FEMORAL SPONT: NORMAL
BH CV LOWER VASCULAR RIGHT PROXIMAL FEMORAL COMPRESS: NORMAL
BH CV LOWER VASCULAR RIGHT PROXIMAL FEMORAL PHASIC: NORMAL
BH CV LOWER VASCULAR RIGHT PROXIMAL FEMORAL SPONT: NORMAL
BH CV LOWER VASCULAR RIGHT SAPHENOFEMORAL JUNCTION COMPRESS: NORMAL
BH CV LOWER VASCULAR RIGHT SAPHENOFEMORAL JUNCTION PHASIC: NORMAL
BH CV LOWER VASCULAR RIGHT SAPHENOFEMORAL JUNCTION SPONT: NORMAL

## 2024-05-28 PROCEDURE — 93970 EXTREMITY STUDY: CPT

## 2025-04-16 RX ORDER — TRAZODONE HYDROCHLORIDE 100 MG/1
100 TABLET ORAL NIGHTLY
Qty: 90 TABLET | Refills: 3 | Status: SHIPPED | OUTPATIENT
Start: 2025-04-16

## 2025-04-28 RX ORDER — ATORVASTATIN CALCIUM 80 MG/1
80 TABLET, FILM COATED ORAL DAILY
Qty: 90 TABLET | Refills: 3 | Status: SHIPPED | OUTPATIENT
Start: 2025-04-28

## 2025-05-15 RX ORDER — LISINOPRIL 40 MG/1
40 TABLET ORAL DAILY
Qty: 90 TABLET | Refills: 3 | Status: SHIPPED | OUTPATIENT
Start: 2025-05-15

## 2025-05-23 DIAGNOSIS — Z00.00 ROUTINE GENERAL MEDICAL EXAMINATION AT A HEALTH CARE FACILITY: ICD-10-CM

## 2025-05-23 RX ORDER — HYDROCHLOROTHIAZIDE 25 MG/1
25 TABLET ORAL DAILY
Qty: 30 TABLET | Refills: 1 | Status: SHIPPED | OUTPATIENT
Start: 2025-05-23

## 2025-07-22 DIAGNOSIS — Z00.00 ROUTINE GENERAL MEDICAL EXAMINATION AT A HEALTH CARE FACILITY: ICD-10-CM

## 2025-07-22 RX ORDER — HYDROCHLOROTHIAZIDE 25 MG/1
25 TABLET ORAL DAILY
Qty: 30 TABLET | Refills: 0 | Status: SHIPPED | OUTPATIENT
Start: 2025-07-22

## 2025-08-18 DIAGNOSIS — Z00.00 ROUTINE GENERAL MEDICAL EXAMINATION AT A HEALTH CARE FACILITY: ICD-10-CM

## 2025-08-18 RX ORDER — HYDROCHLOROTHIAZIDE 25 MG/1
25 TABLET ORAL DAILY
Qty: 30 TABLET | Refills: 0 | Status: SHIPPED | OUTPATIENT
Start: 2025-08-18